# Patient Record
Sex: MALE | Race: WHITE | NOT HISPANIC OR LATINO
[De-identification: names, ages, dates, MRNs, and addresses within clinical notes are randomized per-mention and may not be internally consistent; named-entity substitution may affect disease eponyms.]

---

## 2017-01-30 ENCOUNTER — APPOINTMENT (OUTPATIENT)
Dept: FAMILY MEDICINE | Facility: CLINIC | Age: 77
End: 2017-01-30

## 2017-01-30 VITALS — DIASTOLIC BLOOD PRESSURE: 90 MMHG | SYSTOLIC BLOOD PRESSURE: 150 MMHG

## 2017-01-30 LAB
INR PPP: 1.3 RATIO
POCT-PROTHROMBIN TIME: 15.4 SECS

## 2017-01-31 LAB
INR PPP: 1.32 RATIO
PT BLD: 15 SEC

## 2017-02-07 ENCOUNTER — RX RENEWAL (OUTPATIENT)
Age: 77
End: 2017-02-07

## 2017-03-06 ENCOUNTER — RX RENEWAL (OUTPATIENT)
Age: 77
End: 2017-03-06

## 2017-03-30 ENCOUNTER — APPOINTMENT (OUTPATIENT)
Dept: FAMILY MEDICINE | Facility: CLINIC | Age: 77
End: 2017-03-30

## 2017-03-30 VITALS — SYSTOLIC BLOOD PRESSURE: 130 MMHG | DIASTOLIC BLOOD PRESSURE: 86 MMHG

## 2017-03-30 DIAGNOSIS — Z12.5 ENCOUNTER FOR SCREENING FOR MALIGNANT NEOPLASM OF PROSTATE: ICD-10-CM

## 2017-03-30 DIAGNOSIS — R53.81 OTHER MALAISE: ICD-10-CM

## 2017-03-31 LAB
ALBUMIN SERPL ELPH-MCNC: 4.5 G/DL
ALP BLD-CCNC: 71 U/L
ALT SERPL-CCNC: 14 U/L
ANION GAP SERPL CALC-SCNC: 17 MMOL/L
AST SERPL-CCNC: 21 U/L
BASOPHILS # BLD AUTO: 0.05 K/UL
BASOPHILS NFR BLD AUTO: 0.6 %
BILIRUB SERPL-MCNC: 0.8 MG/DL
BUN SERPL-MCNC: 31 MG/DL
CALCIUM SERPL-MCNC: 10.1 MG/DL
CHLORIDE SERPL-SCNC: 104 MMOL/L
CHOLEST SERPL-MCNC: 249 MG/DL
CHOLEST/HDLC SERPL: 4.8 RATIO
CO2 SERPL-SCNC: 21 MMOL/L
CREAT SERPL-MCNC: 1.34 MG/DL
EOSINOPHIL # BLD AUTO: 0.35 K/UL
EOSINOPHIL NFR BLD AUTO: 4 %
GLUCOSE SERPL-MCNC: 105 MG/DL
HCT VFR BLD CALC: 45.4 %
HDLC SERPL-MCNC: 52 MG/DL
HGB BLD-MCNC: 14.8 G/DL
IMM GRANULOCYTES NFR BLD AUTO: 0.2 %
INR PPP: 1.78 RATIO
LDLC SERPL CALC-MCNC: 174 MG/DL
LYMPHOCYTES # BLD AUTO: 1.74 K/UL
LYMPHOCYTES NFR BLD AUTO: 19.9 %
MAN DIFF?: NORMAL
MCHC RBC-ENTMCNC: 29.7 PG
MCHC RBC-ENTMCNC: 32.6 GM/DL
MCV RBC AUTO: 91.2 FL
MONOCYTES # BLD AUTO: 1 K/UL
MONOCYTES NFR BLD AUTO: 11.4 %
NEUTROPHILS # BLD AUTO: 5.6 K/UL
NEUTROPHILS NFR BLD AUTO: 63.9 %
PLATELET # BLD AUTO: 267 K/UL
POTASSIUM SERPL-SCNC: 4.6 MMOL/L
PROT SERPL-MCNC: 7.7 G/DL
PSA SERPL-MCNC: 68.63 NG/ML
PT BLD: 20.4 SEC
RBC # BLD: 4.98 M/UL
RBC # FLD: 14.4 %
SODIUM SERPL-SCNC: 142 MMOL/L
TRIGL SERPL-MCNC: 116 MG/DL
TSH SERPL-ACNC: 0.38 UIU/ML
WBC # FLD AUTO: 8.76 K/UL

## 2017-05-30 ENCOUNTER — APPOINTMENT (OUTPATIENT)
Dept: FAMILY MEDICINE | Facility: CLINIC | Age: 77
End: 2017-05-30

## 2017-05-30 LAB
INR PPP: 1.9 RATIO
POCT-PROTHROMBIN TIME: 23.3 SECS

## 2017-07-31 ENCOUNTER — APPOINTMENT (OUTPATIENT)
Dept: FAMILY MEDICINE | Facility: CLINIC | Age: 77
End: 2017-07-31
Payer: MEDICARE

## 2017-07-31 LAB
INR PPP: 2.1 RATIO
POCT-PROTHROMBIN TIME: 24.6 SECS

## 2017-07-31 PROCEDURE — 85610 PROTHROMBIN TIME: CPT | Mod: QW

## 2017-07-31 PROCEDURE — 99213 OFFICE O/P EST LOW 20 MIN: CPT | Mod: 25

## 2017-09-05 ENCOUNTER — RX RENEWAL (OUTPATIENT)
Age: 77
End: 2017-09-05

## 2017-09-25 ENCOUNTER — APPOINTMENT (OUTPATIENT)
Dept: FAMILY MEDICINE | Facility: CLINIC | Age: 77
End: 2017-09-25
Payer: MEDICARE

## 2017-09-25 VITALS
HEIGHT: 72 IN | SYSTOLIC BLOOD PRESSURE: 130 MMHG | WEIGHT: 235 LBS | BODY MASS INDEX: 31.83 KG/M2 | DIASTOLIC BLOOD PRESSURE: 88 MMHG

## 2017-09-25 LAB
INR PPP: 3 RATIO
POCT-PROTHROMBIN TIME: 36.1 SECS

## 2017-09-25 PROCEDURE — 99213 OFFICE O/P EST LOW 20 MIN: CPT | Mod: 25

## 2017-09-25 PROCEDURE — 85610 PROTHROMBIN TIME: CPT | Mod: QW

## 2017-09-29 ENCOUNTER — RX RENEWAL (OUTPATIENT)
Age: 77
End: 2017-09-29

## 2017-12-04 ENCOUNTER — APPOINTMENT (OUTPATIENT)
Dept: FAMILY MEDICINE | Facility: CLINIC | Age: 77
End: 2017-12-04
Payer: MEDICARE

## 2017-12-04 VITALS
BODY MASS INDEX: 31.83 KG/M2 | HEIGHT: 72 IN | SYSTOLIC BLOOD PRESSURE: 110 MMHG | WEIGHT: 235 LBS | DIASTOLIC BLOOD PRESSURE: 70 MMHG

## 2017-12-04 LAB
INR PPP: 2.5 RATIO
POCT-PROTHROMBIN TIME: 30.4 SECS

## 2017-12-04 PROCEDURE — 85610 PROTHROMBIN TIME: CPT | Mod: QW

## 2017-12-04 PROCEDURE — 99214 OFFICE O/P EST MOD 30 MIN: CPT | Mod: 25

## 2018-02-05 ENCOUNTER — APPOINTMENT (OUTPATIENT)
Dept: FAMILY MEDICINE | Facility: CLINIC | Age: 78
End: 2018-02-05
Payer: MEDICARE

## 2018-02-05 VITALS — DIASTOLIC BLOOD PRESSURE: 84 MMHG | SYSTOLIC BLOOD PRESSURE: 120 MMHG

## 2018-02-05 PROCEDURE — 99214 OFFICE O/P EST MOD 30 MIN: CPT | Mod: 25

## 2018-02-05 PROCEDURE — 36415 COLL VENOUS BLD VENIPUNCTURE: CPT

## 2018-02-06 LAB
ALBUMIN SERPL ELPH-MCNC: 4.4 G/DL
ALP BLD-CCNC: 71 U/L
ALT SERPL-CCNC: 17 U/L
ANION GAP SERPL CALC-SCNC: 19 MMOL/L
AST SERPL-CCNC: 22 U/L
BASOPHILS # BLD AUTO: 0.06 K/UL
BASOPHILS NFR BLD AUTO: 0.7 %
BILIRUB SERPL-MCNC: 0.7 MG/DL
BUN SERPL-MCNC: 22 MG/DL
CALCIUM SERPL-MCNC: 9.7 MG/DL
CHLORIDE SERPL-SCNC: 105 MMOL/L
CHOLEST SERPL-MCNC: 229 MG/DL
CHOLEST/HDLC SERPL: 5.1 RATIO
CO2 SERPL-SCNC: 18 MMOL/L
CREAT SERPL-MCNC: 1.18 MG/DL
EOSINOPHIL # BLD AUTO: 0.1 K/UL
EOSINOPHIL NFR BLD AUTO: 1.2 %
GLUCOSE SERPL-MCNC: 112 MG/DL
HCT VFR BLD CALC: 47.4 %
HDLC SERPL-MCNC: 45 MG/DL
HGB BLD-MCNC: 15.2 G/DL
IMM GRANULOCYTES NFR BLD AUTO: 0.1 %
INR PPP: 1.54 RATIO
LDLC SERPL CALC-MCNC: 159 MG/DL
LYMPHOCYTES # BLD AUTO: 1.47 K/UL
LYMPHOCYTES NFR BLD AUTO: 18 %
MAN DIFF?: NORMAL
MCHC RBC-ENTMCNC: 29.6 PG
MCHC RBC-ENTMCNC: 32.1 GM/DL
MCV RBC AUTO: 92.2 FL
MONOCYTES # BLD AUTO: 0.55 K/UL
MONOCYTES NFR BLD AUTO: 6.7 %
NEUTROPHILS # BLD AUTO: 5.98 K/UL
NEUTROPHILS NFR BLD AUTO: 73.3 %
PLATELET # BLD AUTO: 280 K/UL
POTASSIUM SERPL-SCNC: 4.7 MMOL/L
PROT SERPL-MCNC: 7.9 G/DL
PT BLD: 17.6 SEC
RBC # BLD: 5.14 M/UL
RBC # FLD: 14.4 %
SODIUM SERPL-SCNC: 142 MMOL/L
TRIGL SERPL-MCNC: 123 MG/DL
TSH SERPL-ACNC: 0.51 UIU/ML
WBC # FLD AUTO: 8.17 K/UL

## 2018-03-22 ENCOUNTER — RX RENEWAL (OUTPATIENT)
Age: 78
End: 2018-03-22

## 2018-04-09 ENCOUNTER — APPOINTMENT (OUTPATIENT)
Dept: FAMILY MEDICINE | Facility: CLINIC | Age: 78
End: 2018-04-09
Payer: MEDICARE

## 2018-04-09 VITALS — WEIGHT: 235 LBS | BODY MASS INDEX: 31.83 KG/M2 | HEIGHT: 72 IN

## 2018-04-09 VITALS — SYSTOLIC BLOOD PRESSURE: 126 MMHG | DIASTOLIC BLOOD PRESSURE: 96 MMHG

## 2018-04-09 LAB
INR PPP: 2.4 RATIO
POCT-PROTHROMBIN TIME: 29.1 SECS

## 2018-04-09 PROCEDURE — 99213 OFFICE O/P EST LOW 20 MIN: CPT

## 2018-06-25 ENCOUNTER — APPOINTMENT (OUTPATIENT)
Dept: FAMILY MEDICINE | Facility: CLINIC | Age: 78
End: 2018-06-25
Payer: MEDICARE

## 2018-06-25 VITALS
BODY MASS INDEX: 31.83 KG/M2 | SYSTOLIC BLOOD PRESSURE: 130 MMHG | DIASTOLIC BLOOD PRESSURE: 92 MMHG | WEIGHT: 235 LBS | HEIGHT: 72 IN

## 2018-06-25 LAB
INR PPP: 2.1 RATIO
POCT-PROTHROMBIN TIME: 24.9 SECS
QUALITY CONTROL: YES

## 2018-06-25 PROCEDURE — 99214 OFFICE O/P EST MOD 30 MIN: CPT | Mod: 25

## 2018-06-25 PROCEDURE — 85610 PROTHROMBIN TIME: CPT | Mod: QW

## 2018-08-27 ENCOUNTER — APPOINTMENT (OUTPATIENT)
Dept: FAMILY MEDICINE | Facility: CLINIC | Age: 78
End: 2018-08-27
Payer: MEDICARE

## 2018-08-27 VITALS
SYSTOLIC BLOOD PRESSURE: 140 MMHG | BODY MASS INDEX: 31.83 KG/M2 | HEIGHT: 72 IN | TEMPERATURE: 97.6 F | HEART RATE: 90 BPM | WEIGHT: 235 LBS | DIASTOLIC BLOOD PRESSURE: 90 MMHG | OXYGEN SATURATION: 98 %

## 2018-08-27 DIAGNOSIS — Z51.81 ENCOUNTER FOR THERAPEUTIC DRUG LVL MONITORING: ICD-10-CM

## 2018-08-27 DIAGNOSIS — Z79.01 ENCOUNTER FOR THERAPEUTIC DRUG LVL MONITORING: ICD-10-CM

## 2018-08-27 LAB
INR PPP: 1.6 RATIO
POCT-PROTHROMBIN TIME: 19.3 SECS
QUALITY CONTROL: YES

## 2018-08-27 PROCEDURE — 99213 OFFICE O/P EST LOW 20 MIN: CPT | Mod: 25

## 2018-08-27 PROCEDURE — 85610 PROTHROMBIN TIME: CPT | Mod: QW

## 2018-08-28 LAB
ALBUMIN SERPL ELPH-MCNC: 4.2 G/DL
ALP BLD-CCNC: 98 U/L
ALT SERPL-CCNC: 19 U/L
ANION GAP SERPL CALC-SCNC: 13 MMOL/L
AST SERPL-CCNC: 23 U/L
BASOPHILS # BLD AUTO: 0.05 K/UL
BASOPHILS NFR BLD AUTO: 0.6 %
BILIRUB SERPL-MCNC: 0.8 MG/DL
BUN SERPL-MCNC: 26 MG/DL
CALCIUM SERPL-MCNC: 9.8 MG/DL
CHLORIDE SERPL-SCNC: 105 MMOL/L
CHOLEST SERPL-MCNC: 209 MG/DL
CHOLEST/HDLC SERPL: 4.2 RATIO
CO2 SERPL-SCNC: 23 MMOL/L
CREAT SERPL-MCNC: 1.19 MG/DL
EOSINOPHIL # BLD AUTO: 0.17 K/UL
EOSINOPHIL NFR BLD AUTO: 2 %
GLUCOSE SERPL-MCNC: 100 MG/DL
HCT VFR BLD CALC: 44.2 %
HDLC SERPL-MCNC: 50 MG/DL
HGB BLD-MCNC: 14 G/DL
IMM GRANULOCYTES NFR BLD AUTO: 0.2 %
LDLC SERPL CALC-MCNC: 141 MG/DL
LYMPHOCYTES # BLD AUTO: 1.55 K/UL
LYMPHOCYTES NFR BLD AUTO: 18.1 %
MAN DIFF?: NORMAL
MCHC RBC-ENTMCNC: 30.4 PG
MCHC RBC-ENTMCNC: 31.7 GM/DL
MCV RBC AUTO: 95.9 FL
MONOCYTES # BLD AUTO: 0.7 K/UL
MONOCYTES NFR BLD AUTO: 8.2 %
NEUTROPHILS # BLD AUTO: 6.06 K/UL
NEUTROPHILS NFR BLD AUTO: 70.9 %
PLATELET # BLD AUTO: 304 K/UL
POTASSIUM SERPL-SCNC: 4.7 MMOL/L
PROT SERPL-MCNC: 7.6 G/DL
RBC # BLD: 4.61 M/UL
RBC # FLD: 14.9 %
SODIUM SERPL-SCNC: 141 MMOL/L
TRIGL SERPL-MCNC: 92 MG/DL
TSH SERPL-ACNC: 0.56 UIU/ML
WBC # FLD AUTO: 8.55 K/UL

## 2018-09-24 ENCOUNTER — MEDICATION RENEWAL (OUTPATIENT)
Age: 78
End: 2018-09-24

## 2018-10-29 ENCOUNTER — APPOINTMENT (OUTPATIENT)
Dept: FAMILY MEDICINE | Facility: CLINIC | Age: 78
End: 2018-10-29
Payer: MEDICARE

## 2018-10-29 VITALS
WEIGHT: 235 LBS | DIASTOLIC BLOOD PRESSURE: 100 MMHG | SYSTOLIC BLOOD PRESSURE: 140 MMHG | BODY MASS INDEX: 31.83 KG/M2 | HEIGHT: 72 IN

## 2018-10-29 LAB
INR PPP: 2.4 RATIO
POCT-PROTHROMBIN TIME: 28.2 SECS
QUALITY CONTROL: YES

## 2018-10-29 PROCEDURE — 93000 ELECTROCARDIOGRAM COMPLETE: CPT

## 2018-10-29 PROCEDURE — 85610 PROTHROMBIN TIME: CPT | Mod: QW

## 2018-10-29 PROCEDURE — G0438: CPT

## 2018-12-31 ENCOUNTER — APPOINTMENT (OUTPATIENT)
Dept: FAMILY MEDICINE | Facility: CLINIC | Age: 78
End: 2018-12-31
Payer: MEDICARE

## 2018-12-31 ENCOUNTER — RESULT CHARGE (OUTPATIENT)
Age: 78
End: 2018-12-31

## 2018-12-31 VITALS — DIASTOLIC BLOOD PRESSURE: 90 MMHG | SYSTOLIC BLOOD PRESSURE: 128 MMHG

## 2018-12-31 DIAGNOSIS — M19.90 UNSPECIFIED OSTEOARTHRITIS, UNSPECIFIED SITE: ICD-10-CM

## 2018-12-31 LAB
INR PPP: 4.1 RATIO
POCT-PROTHROMBIN TIME: 48.7 SECS
QUALITY CONTROL: YES

## 2018-12-31 PROCEDURE — 85610 PROTHROMBIN TIME: CPT | Mod: QW

## 2018-12-31 PROCEDURE — 99213 OFFICE O/P EST LOW 20 MIN: CPT | Mod: 25

## 2019-02-25 ENCOUNTER — APPOINTMENT (OUTPATIENT)
Dept: FAMILY MEDICINE | Facility: CLINIC | Age: 79
End: 2019-02-25
Payer: MEDICARE

## 2019-02-25 VITALS — SYSTOLIC BLOOD PRESSURE: 140 MMHG | DIASTOLIC BLOOD PRESSURE: 90 MMHG

## 2019-02-25 DIAGNOSIS — E78.00 PURE HYPERCHOLESTEROLEMIA, UNSPECIFIED: ICD-10-CM

## 2019-02-25 DIAGNOSIS — Z00.00 ENCOUNTER FOR GENERAL ADULT MEDICAL EXAMINATION W/OUT ABNORMAL FINDINGS: ICD-10-CM

## 2019-02-25 DIAGNOSIS — I10 ESSENTIAL (PRIMARY) HYPERTENSION: ICD-10-CM

## 2019-02-25 DIAGNOSIS — I48.91 UNSPECIFIED ATRIAL FIBRILLATION: ICD-10-CM

## 2019-02-25 PROCEDURE — 36415 COLL VENOUS BLD VENIPUNCTURE: CPT

## 2019-02-25 PROCEDURE — 99213 OFFICE O/P EST LOW 20 MIN: CPT

## 2019-02-25 PROCEDURE — 85610 PROTHROMBIN TIME: CPT | Mod: QW

## 2019-02-25 PROCEDURE — G0438: CPT

## 2019-02-26 LAB
ALBUMIN SERPL ELPH-MCNC: 3.9 G/DL
ALP BLD-CCNC: 2498 U/L
ALT SERPL-CCNC: 21 U/L
ANION GAP SERPL CALC-SCNC: 13 MMOL/L
AST SERPL-CCNC: 44 U/L
BASOPHILS # BLD AUTO: 0.08 K/UL
BASOPHILS NFR BLD AUTO: 0.8 %
BILIRUB SERPL-MCNC: 0.5 MG/DL
BUN SERPL-MCNC: 28 MG/DL
CALCIUM SERPL-MCNC: 9.4 MG/DL
CHLORIDE SERPL-SCNC: 104 MMOL/L
CHOLEST SERPL-MCNC: 172 MG/DL
CHOLEST/HDLC SERPL: 3.6 RATIO
CO2 SERPL-SCNC: 23 MMOL/L
CREAT SERPL-MCNC: 1.27 MG/DL
EOSINOPHIL # BLD AUTO: 0.03 K/UL
EOSINOPHIL NFR BLD AUTO: 0.3 %
GLUCOSE SERPL-MCNC: 118 MG/DL
HCT VFR BLD CALC: 42.3 %
HDLC SERPL-MCNC: 48 MG/DL
HGB BLD-MCNC: 12.5 G/DL
IMM GRANULOCYTES NFR BLD AUTO: 0.9 %
LDLC SERPL CALC-MCNC: 106 MG/DL
LYMPHOCYTES # BLD AUTO: 1.25 K/UL
LYMPHOCYTES NFR BLD AUTO: 12.3 %
MAN DIFF?: NORMAL
MCHC RBC-ENTMCNC: 26.9 PG
MCHC RBC-ENTMCNC: 29.6 GM/DL
MCV RBC AUTO: 91.2 FL
MONOCYTES # BLD AUTO: 0.99 K/UL
MONOCYTES NFR BLD AUTO: 9.8 %
NEUTROPHILS # BLD AUTO: 7.71 K/UL
NEUTROPHILS NFR BLD AUTO: 75.9 %
PLATELET # BLD AUTO: 417 K/UL
POTASSIUM SERPL-SCNC: 5.4 MMOL/L
PROT SERPL-MCNC: 7.7 G/DL
RBC # BLD: 4.64 M/UL
RBC # FLD: 15.2 %
SODIUM SERPL-SCNC: 140 MMOL/L
TRIGL SERPL-MCNC: 90 MG/DL
TSH SERPL-ACNC: 0.14 UIU/ML
WBC # FLD AUTO: 10.15 K/UL

## 2019-03-01 ENCOUNTER — APPOINTMENT (OUTPATIENT)
Dept: UROLOGY | Facility: CLINIC | Age: 79
End: 2019-03-01
Payer: MEDICARE

## 2019-03-01 VITALS
BODY MASS INDEX: 25.77 KG/M2 | WEIGHT: 190.25 LBS | HEART RATE: 124 BPM | HEIGHT: 72 IN | DIASTOLIC BLOOD PRESSURE: 70 MMHG | OXYGEN SATURATION: 97 % | SYSTOLIC BLOOD PRESSURE: 101 MMHG

## 2019-03-01 DIAGNOSIS — R97.20 ELEVATED PROSTATE, SPECIFIC ANTIGEN [PSA]: ICD-10-CM

## 2019-03-01 PROCEDURE — 99204 OFFICE O/P NEW MOD 45 MIN: CPT

## 2019-03-01 NOTE — REVIEW OF SYSTEMS
[Negative] : Heme/Lymph [Feeling Tired] : feeling tired [Recent Weight Loss (___ Lbs)] : recent [unfilled] ~Ulb weight loss [Wake up at night to urinate  How many times?  ___] : wakes up to urinate [unfilled] times during the night [Joint Pain] : joint pain [Joint Swelling] : joint swelling [Difficulty Walking] : difficulty walking

## 2019-03-08 LAB — PSA SERPL-MCNC: 770 NG/ML

## 2019-03-10 NOTE — HISTORY OF PRESENT ILLNESS
[FreeTextEntry1] : Jesús Luong presents to the office today. He is a 78-year-old man who had been previously noted to have PSA elevation. He had been recommended by his primary care physician to see a urologist but he never did so. He has now lost about 40 pounds by his own report. He is having some pain in his were occasional rotational movements. He has been found to have more significant PSA elevation and alkaline phosphatase level of 2500. He denies ever having had seen a urologist in the past he also denies having a prostate biopsy. He does not know of any known family history or personal history of prostate cancer diagnosis but does understand that his most recent laboratory evaluation strongly suggests the presence of prostate cancer based on his discussion with his primary care physician. He is now here today to have initial assessment from a urologic standpoint regarding these findings.\par \par The patient reports some baseline urinary symptoms including nocturia x3 and some sensation of incomplete bladder emptying. He also has frequency and occasionally feels the need to push to initiate his urinary stream.\par \par His medical history is notable for atrial fibrillation and is maintained on anticoagulation for this reason.

## 2019-03-10 NOTE — ASSESSMENT
[FreeTextEntry1] : I checked his PSA level today which is 770 ng/mL. His prostate is firm and feel suspicious for malignancy. These findings together plus his alkaline phosphatase level a markedly elevated all suggest very strong likelihood of metastatic prostate cancer involving his bones. His rib pain may also be related to metastatic prostate cancer.\par \par I would suggest initial evaluation with imaging to assess for the presence of prostate cancer metastasis. I have recommended CT scan of the chest, abdomen, and pelvis. I have also recommended bone scan imaging to assess for the presence of bony metastasis through the entire skeleton.\par \par Depending on those findings, further evaluation with prostate biopsy may be considered to establish a pathologic diagnosis.\par \par Most likely he is headed toward treatment with combined androgen deprivation therapy but I will likely enlist the help of a medical oncologist for further assessment as there may be other treatment indicated here given what appears to be a very advanced disease status.

## 2019-03-11 ENCOUNTER — OTHER (OUTPATIENT)
Age: 79
End: 2019-03-11

## 2019-03-13 ENCOUNTER — OTHER (OUTPATIENT)
Age: 79
End: 2019-03-13

## 2019-03-18 ENCOUNTER — APPOINTMENT (OUTPATIENT)
Dept: FAMILY MEDICINE | Facility: CLINIC | Age: 79
End: 2019-03-18

## 2019-03-19 ENCOUNTER — FORM ENCOUNTER (OUTPATIENT)
Age: 79
End: 2019-03-19

## 2019-03-20 ENCOUNTER — APPOINTMENT (OUTPATIENT)
Dept: CT IMAGING | Facility: IMAGING CENTER | Age: 79
End: 2019-03-20
Payer: MEDICARE

## 2019-03-20 ENCOUNTER — APPOINTMENT (OUTPATIENT)
Dept: NUCLEAR MEDICINE | Facility: IMAGING CENTER | Age: 79
End: 2019-03-20
Payer: MEDICARE

## 2019-03-20 ENCOUNTER — OUTPATIENT (OUTPATIENT)
Dept: OUTPATIENT SERVICES | Facility: HOSPITAL | Age: 79
LOS: 1 days | End: 2019-03-20
Payer: COMMERCIAL

## 2019-03-20 DIAGNOSIS — C61 MALIGNANT NEOPLASM OF PROSTATE: ICD-10-CM

## 2019-03-20 PROCEDURE — A9561: CPT

## 2019-03-20 PROCEDURE — 78320: CPT | Mod: 26

## 2019-03-20 PROCEDURE — 71260 CT THORAX DX C+: CPT

## 2019-03-20 PROCEDURE — 78306 BONE IMAGING WHOLE BODY: CPT | Mod: 26

## 2019-03-20 PROCEDURE — 78999 UNLISTED MISC PX DX NUC MED: CPT

## 2019-03-20 PROCEDURE — 74177 CT ABD & PELVIS W/CONTRAST: CPT

## 2019-03-20 PROCEDURE — 74177 CT ABD & PELVIS W/CONTRAST: CPT | Mod: 26

## 2019-03-20 PROCEDURE — 71260 CT THORAX DX C+: CPT | Mod: 26

## 2019-03-20 PROCEDURE — 78306 BONE IMAGING WHOLE BODY: CPT

## 2019-03-20 RX ORDER — BICALUTAMIDE 50 MG/1
50 TABLET ORAL
Qty: 90 | Refills: 3 | Status: ACTIVE | COMMUNITY
Start: 2019-03-20 | End: 1900-01-01

## 2019-04-02 ENCOUNTER — APPOINTMENT (OUTPATIENT)
Dept: UROLOGY | Facility: CLINIC | Age: 79
End: 2019-04-02
Payer: MEDICARE

## 2019-04-02 ENCOUNTER — OUTPATIENT (OUTPATIENT)
Dept: OUTPATIENT SERVICES | Facility: HOSPITAL | Age: 79
LOS: 1 days | End: 2019-04-02
Payer: COMMERCIAL

## 2019-04-02 VITALS
SYSTOLIC BLOOD PRESSURE: 127 MMHG | DIASTOLIC BLOOD PRESSURE: 82 MMHG | RESPIRATION RATE: 16 BRPM | TEMPERATURE: 98.1 F | HEART RATE: 137 BPM

## 2019-04-02 DIAGNOSIS — C61 MALIGNANT NEOPLASM OF PROSTATE: ICD-10-CM

## 2019-04-02 DIAGNOSIS — R35.0 FREQUENCY OF MICTURITION: ICD-10-CM

## 2019-04-02 PROCEDURE — 76872 US TRANSRECTAL: CPT

## 2019-04-02 PROCEDURE — 55700: CPT | Mod: 22

## 2019-04-02 PROCEDURE — 96402 CHEMO HORMON ANTINEOPL SQ/IM: CPT | Mod: 59

## 2019-04-02 PROCEDURE — 76872 US TRANSRECTAL: CPT | Mod: 26

## 2019-04-02 PROCEDURE — 76942 ECHO GUIDE FOR BIOPSY: CPT | Mod: 26,59

## 2019-04-02 PROCEDURE — 55700: CPT

## 2019-04-02 PROCEDURE — 76942 ECHO GUIDE FOR BIOPSY: CPT | Mod: 59

## 2019-04-02 RX ORDER — LEUPROLIDE ACETATE 45 MG
45 KIT INTRAMUSCULAR
Qty: 1 | Refills: 0 | Status: COMPLETED | OUTPATIENT
Start: 2019-04-02 | End: 2019-04-02

## 2019-04-02 RX ORDER — LEUPROLIDE ACETATE 45 MG
45 KIT INTRAMUSCULAR
Qty: 1 | Refills: 0 | Status: COMPLETED | OUTPATIENT
Start: 2019-04-02

## 2019-04-02 RX ADMIN — LEUPROLIDE ACETATE 0 MG: KIT at 00:00

## 2019-04-04 DIAGNOSIS — C61 MALIGNANT NEOPLASM OF PROSTATE: ICD-10-CM

## 2019-04-05 LAB — CORE LAB BIOPSY: NORMAL

## 2019-04-09 ENCOUNTER — OUTPATIENT (OUTPATIENT)
Dept: OUTPATIENT SERVICES | Facility: HOSPITAL | Age: 79
LOS: 1 days | Discharge: HOME | End: 2019-04-09

## 2019-04-09 ENCOUNTER — APPOINTMENT (OUTPATIENT)
Dept: HEMATOLOGY ONCOLOGY | Facility: CLINIC | Age: 79
End: 2019-04-09
Payer: MEDICARE

## 2019-04-09 ENCOUNTER — RESULT REVIEW (OUTPATIENT)
Age: 79
End: 2019-04-09

## 2019-04-09 VITALS
HEART RATE: 108 BPM | BODY MASS INDEX: 25.77 KG/M2 | RESPIRATION RATE: 16 BRPM | DIASTOLIC BLOOD PRESSURE: 72 MMHG | OXYGEN SATURATION: 97 % | SYSTOLIC BLOOD PRESSURE: 115 MMHG | TEMPERATURE: 97.5 F | WEIGHT: 190 LBS

## 2019-04-09 DIAGNOSIS — Z85.46 PERSONAL HISTORY OF MALIGNANT NEOPLASM OF PROSTATE: ICD-10-CM

## 2019-04-09 DIAGNOSIS — Z86.79 PERSONAL HISTORY OF OTHER DISEASES OF THE CIRCULATORY SYSTEM: ICD-10-CM

## 2019-04-09 DIAGNOSIS — Z87.11 PERSONAL HISTORY OF PEPTIC ULCER DISEASE: ICD-10-CM

## 2019-04-09 LAB
BASOPHILS # BLD AUTO: 0 K/UL — SIGNIFICANT CHANGE UP (ref 0–0.2)
BASOPHILS NFR BLD AUTO: 0.3 % — SIGNIFICANT CHANGE UP (ref 0–2)
EOSINOPHIL # BLD AUTO: 0.2 K/UL — SIGNIFICANT CHANGE UP (ref 0–0.5)
EOSINOPHIL NFR BLD AUTO: 1.8 % — SIGNIFICANT CHANGE UP (ref 0–6)
HCT VFR BLD CALC: 37.9 % — LOW (ref 39–50)
HGB BLD-MCNC: 12.8 G/DL — LOW (ref 13–17)
LYMPHOCYTES # BLD AUTO: 1 K/UL — SIGNIFICANT CHANGE UP (ref 1–3.3)
LYMPHOCYTES # BLD AUTO: 9.7 % — LOW (ref 13–44)
MCHC RBC-ENTMCNC: 29 PG — SIGNIFICANT CHANGE UP (ref 27–34)
MCHC RBC-ENTMCNC: 33.7 G/DL — SIGNIFICANT CHANGE UP (ref 32–36)
MCV RBC AUTO: 86 FL — SIGNIFICANT CHANGE UP (ref 80–100)
MONOCYTES # BLD AUTO: 1.3 K/UL — HIGH (ref 0–0.9)
MONOCYTES NFR BLD AUTO: 11.9 % — SIGNIFICANT CHANGE UP (ref 2–14)
NEUTROPHILS # BLD AUTO: 8.1 K/UL — HIGH (ref 1.8–7.4)
NEUTROPHILS NFR BLD AUTO: 76.2 % — SIGNIFICANT CHANGE UP (ref 43–77)
PLATELET # BLD AUTO: 287 K/UL — SIGNIFICANT CHANGE UP (ref 150–400)
RBC # BLD: 4.41 M/UL — SIGNIFICANT CHANGE UP (ref 4.2–5.8)
RBC # FLD: 17.4 % — HIGH (ref 10.3–14.5)
WBC # BLD: 10.6 K/UL — HIGH (ref 3.8–10.5)
WBC # FLD AUTO: 10.6 K/UL — HIGH (ref 3.8–10.5)

## 2019-04-09 PROCEDURE — 99205 OFFICE O/P NEW HI 60 MIN: CPT

## 2019-04-09 RX ORDER — PREDNISONE 5 MG/1
5 TABLET ORAL TWICE DAILY
Qty: 60 | Refills: 5 | Status: ACTIVE | COMMUNITY
Start: 2019-04-09 | End: 1900-01-01

## 2019-04-09 RX ORDER — OXYCODONE AND ACETAMINOPHEN 5; 325 MG/1; MG/1
5-325 TABLET ORAL
Qty: 120 | Refills: 0 | Status: ACTIVE | COMMUNITY
Start: 2019-04-09 | End: 1900-01-01

## 2019-04-09 RX ORDER — CALCIUM CARBONATE/VITAMIN D3 600 MG-10
600-400 TABLET ORAL
Qty: 90 | Refills: 5 | Status: ACTIVE | COMMUNITY
Start: 2019-04-09 | End: 1900-01-01

## 2019-04-09 RX ORDER — ABIRATERONE ACETATE 500 MG/1
500 TABLET, FILM COATED ORAL
Qty: 60 | Refills: 5 | Status: ACTIVE | COMMUNITY
Start: 2019-04-09 | End: 1900-01-01

## 2019-04-10 DIAGNOSIS — Z63.4 DISAPPEARANCE AND DEATH OF FAMILY MEMBER: ICD-10-CM

## 2019-04-10 DIAGNOSIS — Z60.2 PROBLEMS RELATED TO LIVING ALONE: ICD-10-CM

## 2019-04-10 SDOH — SOCIAL STABILITY - SOCIAL INSECURITY: DISSAPEARANCE AND DEATH OF FAMILY MEMBER: Z63.4

## 2019-04-10 SDOH — SOCIAL STABILITY - SOCIAL INSECURITY: PROBLEMS RELATED TO LIVING ALONE: Z60.2

## 2019-04-12 NOTE — PHYSICAL EXAM
[Capable of only limited self care, confined to bed or chair more than 50% of waking hours] : Status 3- Capable of only limited self care, confined to bed or chair more than 50% of waking hours [Normal] : affect appropriate [de-identified] : neck and bilateral hip tenderness with limited range of motion

## 2019-04-12 NOTE — ASSESSMENT
[Palliative] : Goals of care discussed with patient: Palliative [FreeTextEntry1] : Jesús Reed is a 78 years old male who presented with elevated PSA in 2011, on March 1, 2019 he was found to have , alkaline phosphatase level of 2500. CT chest/abdomen/pelvis on March 20 revealed retroperitoneal and pelvic lymphadenopathy, no visceral mets.  Whole body bone scan showed extensive bone metastases. TRUS biopsy showed prostate adenocarcinoma. He was started lupron and casodex on April 1. \par \par I had a lengthy discussion with the patient regarding his current cancer status and further management. He has extensive metastatic disease in the bones, kale 9. The goal of treatment is palliative. The standard of care is based on CHAARTED trial and Lat[employer] trials. Adding docetaxel or zytiga/prednisone to ADT  significantly prolonged overall survival. Given his age and poor performance status he is not a candidate for docetaxel. I start him zytiga/prednisone with potential AEs including but not limited to fatigue, nausea, vomiting, hypokalemia, hyponatremia, hypertension and liver damage. He needs to check his LFT every 2 weeks in the first 3 months. His many questions were answered in full to his satisfaction.

## 2019-04-12 NOTE — REASON FOR VISIT
[Initial Consultation] : an initial consultation [Spouse] : spouse [FreeTextEntry2] : metastatic prostate cancer

## 2019-04-12 NOTE — CONSULT LETTER
[Dear  ___] : Dear  [unfilled], [Please see my note below.] : Please see my note below. [Consult Letter:] : I had the pleasure of evaluating your patient, [unfilled]. [Sincerely,] : Sincerely, [Consult Closing:] : Thank you very much for allowing me to participate in the care of this patient.  If you have any questions, please do not hesitate to contact me. [DrErik  ___] : Dr. STEINER [FreeTextEntry3] : Ina Sullivan MD

## 2019-04-12 NOTE — HISTORY OF PRESENT ILLNESS
[T: ___] : T[unfilled] [N: ___] : N[unfilled] [M: ___] : M[unfilled] [AJCC Stage: ____] : AJCC Stage: [unfilled] [de-identified] : Jesús Luong a 78-year-old man who had been previously noted to have PSA elevation in 2011. He did not follow up with his PCP recommendation to see Urologist. He has had poor appetite and lost about 40 pounds in last three months. His neck pain and bilateral hip pain has been constant, 7/10. Moving over makes worse. He is not able to walk now.  He was seen by Dr. Aamir Ferreira on March 1, 2019. His , alkaline phosphatase level of 2500. CT chest/abdomen/pelvis on March 20 revealed retroperitoneal and pelvic lymphadenopathy, no visceral mets.  Whole body bone scan showed extensive bone metastases including humerus and femur, tibia, scapula, sternum, ribs. He underwent TRUS biopsy on April 2 with Dr. Ferreira. 4/12 cores showed prostate adenocarcinoma kale 5+4 with 10% to 95% involvement on each core, kale 4+5 2/12 cores positive, 6/12 core kale 4+4 with 20% to 90% involvement. Multiple cores also showed intraductal carcinoma. He did receive bicalutamide one week prior to lupron shot 45mg IM on April 7 which will be due in the beginning of Oct 2019. \par \par Nocturia 3 to 4. Urine flow is normal but slow, drips at the end. He denies frequency, burning and gross hematuria and incontinence.\par \par \par  [de-identified] : prostate adenocarcinoma and intraductal carcinoma

## 2019-04-12 NOTE — REVIEW OF SYSTEMS
[Fatigue] : fatigue [Recent Change In Weight] : ~T recent weight change [Shortness Of Breath] : shortness of breath [Cough] : cough [SOB on Exertion] : shortness of breath during exertion [Constipation] : constipation [Joint Pain] : joint pain [Muscle Pain] : muscle pain [Muscle Weakness] : muscle weakness [Insomnia] : insomnia [Depression] : depression [Anxiety] : anxiety [Negative] : Allergic/Immunologic [Abdominal Pain] : no abdominal pain [Wheezing] : no wheezing [Diarrhea] : no diarrhea [Vomiting] : no vomiting [Joint Stiffness] : no joint stiffness [FreeTextEntry9] : neck and bilateral hip

## 2019-04-14 DIAGNOSIS — C79.51 SECONDARY MALIGNANT NEOPLASM OF BONE: ICD-10-CM

## 2019-04-14 DIAGNOSIS — C61 MALIGNANT NEOPLASM OF PROSTATE: ICD-10-CM

## 2019-04-17 ENCOUNTER — MEDICATION RENEWAL (OUTPATIENT)
Age: 79
End: 2019-04-17

## 2019-04-17 RX ORDER — WHEELCHAIR
EACH MISCELLANEOUS
Qty: 1 | Refills: 0 | Status: ACTIVE | OUTPATIENT
Start: 2019-04-17

## 2019-04-18 ENCOUNTER — INPATIENT (INPATIENT)
Facility: HOSPITAL | Age: 79
LOS: 18 days | Discharge: HOSPICE | End: 2019-05-07
Attending: INTERNAL MEDICINE | Admitting: INTERNAL MEDICINE
Payer: MEDICARE

## 2019-04-18 VITALS
SYSTOLIC BLOOD PRESSURE: 119 MMHG | DIASTOLIC BLOOD PRESSURE: 80 MMHG | RESPIRATION RATE: 19 BRPM | WEIGHT: 190.04 LBS | TEMPERATURE: 98 F | OXYGEN SATURATION: 95 % | HEART RATE: 100 BPM

## 2019-04-18 DIAGNOSIS — I48.2 CHRONIC ATRIAL FIBRILLATION: ICD-10-CM

## 2019-04-18 DIAGNOSIS — M54.5 LOW BACK PAIN: ICD-10-CM

## 2019-04-18 DIAGNOSIS — I10 ESSENTIAL (PRIMARY) HYPERTENSION: ICD-10-CM

## 2019-04-18 DIAGNOSIS — Z29.9 ENCOUNTER FOR PROPHYLACTIC MEASURES, UNSPECIFIED: ICD-10-CM

## 2019-04-18 DIAGNOSIS — C61 MALIGNANT NEOPLASM OF PROSTATE: ICD-10-CM

## 2019-04-18 DIAGNOSIS — R53.1 WEAKNESS: ICD-10-CM

## 2019-04-18 LAB
ALBUMIN SERPL ELPH-MCNC: 3.2 G/DL — LOW (ref 3.3–5)
ALP SERPL-CCNC: 501 U/L — HIGH (ref 40–120)
ALT FLD-CCNC: 19 U/L — SIGNIFICANT CHANGE UP (ref 12–78)
ANION GAP SERPL CALC-SCNC: 10 MMOL/L — SIGNIFICANT CHANGE UP (ref 5–17)
ANISOCYTOSIS BLD QL: SLIGHT — SIGNIFICANT CHANGE UP
APTT BLD: 25.1 SEC — LOW (ref 28.5–37)
AST SERPL-CCNC: 25 U/L — SIGNIFICANT CHANGE UP (ref 15–37)
BASOPHILS # BLD AUTO: 0 K/UL — SIGNIFICANT CHANGE UP (ref 0–0.2)
BASOPHILS NFR BLD AUTO: 0 % — SIGNIFICANT CHANGE UP (ref 0–2)
BILIRUB SERPL-MCNC: 1.4 MG/DL — HIGH (ref 0.2–1.2)
BUN SERPL-MCNC: 73 MG/DL — HIGH (ref 7–23)
BURR CELLS BLD QL SMEAR: SLIGHT — SIGNIFICANT CHANGE UP
CALCIUM SERPL-MCNC: 9.3 MG/DL — SIGNIFICANT CHANGE UP (ref 8.5–10.1)
CHLORIDE SERPL-SCNC: 105 MMOL/L — SIGNIFICANT CHANGE UP (ref 96–108)
CO2 SERPL-SCNC: 20 MMOL/L — LOW (ref 22–31)
CREAT SERPL-MCNC: 1.93 MG/DL — HIGH (ref 0.5–1.3)
ELLIPTOCYTES BLD QL SMEAR: SLIGHT — SIGNIFICANT CHANGE UP
EOSINOPHIL # BLD AUTO: 0 K/UL — SIGNIFICANT CHANGE UP (ref 0–0.5)
EOSINOPHIL NFR BLD AUTO: 0 % — SIGNIFICANT CHANGE UP (ref 0–6)
GLUCOSE SERPL-MCNC: 118 MG/DL — HIGH (ref 70–99)
HCT VFR BLD CALC: 40.3 % — SIGNIFICANT CHANGE UP (ref 39–50)
HGB BLD-MCNC: 13 G/DL — SIGNIFICANT CHANGE UP (ref 13–17)
HYPOCHROMIA BLD QL: SLIGHT — SIGNIFICANT CHANGE UP
INR BLD: 1.28 RATIO — HIGH (ref 0.88–1.16)
LACTATE SERPL-SCNC: 1.7 MMOL/L — SIGNIFICANT CHANGE UP (ref 0.7–2)
LYMPHOCYTES # BLD AUTO: 1.41 K/UL — SIGNIFICANT CHANGE UP (ref 1–3.3)
LYMPHOCYTES # BLD AUTO: 12 % — LOW (ref 13–44)
MACROCYTES BLD QL: SLIGHT — SIGNIFICANT CHANGE UP
MANUAL SMEAR VERIFICATION: SIGNIFICANT CHANGE UP
MCHC RBC-ENTMCNC: 28.7 PG — SIGNIFICANT CHANGE UP (ref 27–34)
MCHC RBC-ENTMCNC: 32.3 GM/DL — SIGNIFICANT CHANGE UP (ref 32–36)
MCV RBC AUTO: 89 FL — SIGNIFICANT CHANGE UP (ref 80–100)
MICROCYTES BLD QL: SIGNIFICANT CHANGE UP
MONOCYTES # BLD AUTO: 0.47 K/UL — SIGNIFICANT CHANGE UP (ref 0–0.9)
MONOCYTES NFR BLD AUTO: 4 % — SIGNIFICANT CHANGE UP (ref 2–14)
NEUTROPHILS # BLD AUTO: 9.86 K/UL — HIGH (ref 1.8–7.4)
NEUTROPHILS NFR BLD AUTO: 84 % — HIGH (ref 43–77)
NRBC # BLD: 1 /100 — HIGH (ref 0–0)
NRBC # BLD: SIGNIFICANT CHANGE UP /100 WBCS (ref 0–0)
OVALOCYTES BLD QL SMEAR: SLIGHT — SIGNIFICANT CHANGE UP
PLAT MORPH BLD: NORMAL — SIGNIFICANT CHANGE UP
PLATELET # BLD AUTO: 253 K/UL — SIGNIFICANT CHANGE UP (ref 150–400)
POIKILOCYTOSIS BLD QL AUTO: SLIGHT — SIGNIFICANT CHANGE UP
POLYCHROMASIA BLD QL SMEAR: SIGNIFICANT CHANGE UP
POTASSIUM SERPL-MCNC: 4.4 MMOL/L — SIGNIFICANT CHANGE UP (ref 3.5–5.3)
POTASSIUM SERPL-SCNC: 4.4 MMOL/L — SIGNIFICANT CHANGE UP (ref 3.5–5.3)
PROT SERPL-MCNC: 7 GM/DL — SIGNIFICANT CHANGE UP (ref 6–8.3)
PROTHROM AB SERPL-ACNC: 14.4 SEC — HIGH (ref 10–12.9)
RBC # BLD: 4.53 M/UL — SIGNIFICANT CHANGE UP (ref 4.2–5.8)
RBC # FLD: 22.5 % — HIGH (ref 10.3–14.5)
RBC BLD AUTO: ABNORMAL
SODIUM SERPL-SCNC: 135 MMOL/L — SIGNIFICANT CHANGE UP (ref 135–145)
WBC # BLD: 11.74 K/UL — HIGH (ref 3.8–10.5)
WBC # FLD AUTO: 11.74 K/UL — HIGH (ref 3.8–10.5)

## 2019-04-18 PROCEDURE — 99223 1ST HOSP IP/OBS HIGH 75: CPT

## 2019-04-18 PROCEDURE — 72128 CT CHEST SPINE W/O DYE: CPT | Mod: 26

## 2019-04-18 PROCEDURE — 99285 EMERGENCY DEPT VISIT HI MDM: CPT

## 2019-04-18 PROCEDURE — 72131 CT LUMBAR SPINE W/O DYE: CPT | Mod: 26

## 2019-04-18 PROCEDURE — 71045 X-RAY EXAM CHEST 1 VIEW: CPT | Mod: 26

## 2019-04-18 PROCEDURE — 93010 ELECTROCARDIOGRAM REPORT: CPT

## 2019-04-18 PROCEDURE — 72125 CT NECK SPINE W/O DYE: CPT | Mod: 26

## 2019-04-18 PROCEDURE — 76377 3D RENDER W/INTRP POSTPROCES: CPT | Mod: 26

## 2019-04-18 RX ORDER — ONDANSETRON 8 MG/1
8 TABLET, FILM COATED ORAL ONCE
Qty: 0 | Refills: 0 | Status: COMPLETED | OUTPATIENT
Start: 2019-04-18 | End: 2019-04-18

## 2019-04-18 RX ORDER — OXYCODONE HYDROCHLORIDE 5 MG/1
10 TABLET ORAL EVERY 12 HOURS
Qty: 0 | Refills: 0 | Status: DISCONTINUED | OUTPATIENT
Start: 2019-04-18 | End: 2019-04-25

## 2019-04-18 RX ORDER — SODIUM CHLORIDE 9 MG/ML
1000 INJECTION INTRAMUSCULAR; INTRAVENOUS; SUBCUTANEOUS
Qty: 0 | Refills: 0 | Status: DISCONTINUED | OUTPATIENT
Start: 2019-04-18 | End: 2019-04-20

## 2019-04-18 RX ORDER — METOPROLOL TARTRATE 50 MG
50 TABLET ORAL
Qty: 0 | Refills: 0 | Status: DISCONTINUED | OUTPATIENT
Start: 2019-04-18 | End: 2019-05-07

## 2019-04-18 RX ORDER — MORPHINE SULFATE 50 MG/1
2 CAPSULE, EXTENDED RELEASE ORAL EVERY 4 HOURS
Qty: 0 | Refills: 0 | Status: DISCONTINUED | OUTPATIENT
Start: 2019-04-18 | End: 2019-04-18

## 2019-04-18 RX ORDER — MORPHINE SULFATE 50 MG/1
4 CAPSULE, EXTENDED RELEASE ORAL ONCE
Qty: 0 | Refills: 0 | Status: DISCONTINUED | OUTPATIENT
Start: 2019-04-18 | End: 2019-04-18

## 2019-04-18 RX ORDER — ONDANSETRON 8 MG/1
4 TABLET, FILM COATED ORAL EVERY 6 HOURS
Qty: 0 | Refills: 0 | Status: DISCONTINUED | OUTPATIENT
Start: 2019-04-18 | End: 2019-05-07

## 2019-04-18 RX ORDER — HEPARIN SODIUM 5000 [USP'U]/ML
5000 INJECTION INTRAVENOUS; SUBCUTANEOUS EVERY 12 HOURS
Qty: 0 | Refills: 0 | Status: DISCONTINUED | OUTPATIENT
Start: 2019-04-18 | End: 2019-05-07

## 2019-04-18 RX ORDER — BICALUTAMIDE 50 MG/1
50 TABLET, FILM COATED ORAL DAILY
Qty: 0 | Refills: 0 | Status: DISCONTINUED | OUTPATIENT
Start: 2019-04-18 | End: 2019-05-06

## 2019-04-18 RX ADMIN — SODIUM CHLORIDE 100 MILLILITER(S): 9 INJECTION INTRAMUSCULAR; INTRAVENOUS; SUBCUTANEOUS at 18:00

## 2019-04-18 RX ADMIN — MORPHINE SULFATE 4 MILLIGRAM(S): 50 CAPSULE, EXTENDED RELEASE ORAL at 14:25

## 2019-04-18 RX ADMIN — Medication 50 MILLIGRAM(S): at 19:39

## 2019-04-18 RX ADMIN — ONDANSETRON 8 MILLIGRAM(S): 8 TABLET, FILM COATED ORAL at 18:00

## 2019-04-18 RX ADMIN — OXYCODONE HYDROCHLORIDE 10 MILLIGRAM(S): 5 TABLET ORAL at 20:20

## 2019-04-18 RX ADMIN — OXYCODONE HYDROCHLORIDE 10 MILLIGRAM(S): 5 TABLET ORAL at 19:40

## 2019-04-18 NOTE — H&P ADULT - HISTORY OF PRESENT ILLNESS
· 78 year old male with PMH of Atrial Fibrillation (no longer on Coumadin), HTN, Prostate CA with mets to bone otherwise presenting due to increased back and neck pain and generalized weakness for past 2-3 weeks. Today  feeling extremely weak and having difficulty getting up or caring for himself. Patient lives alone. Patient reports one episode of vomiting this AM.  Denies chest pain, SOB,  fever/chills or headache. Patient cannot recall his medications. Calls to Dr Jimenez, office closed, needs to be followed up in AM for list of medications. · 78 year old male with PMH of Atrial Fibrillation (no longer on Coumadin), HTN, Prostate CA with mets to bone otherwise presenting due to increased back and neck pain and generalized weakness for past 2-3 weeks. Today  feeling extremely weak and having difficulty getting up or caring for himself. Patient lives alone. Patient reports one episode of vomiting this AM.  Denies chest pain, SOB,  fever/chills or headache. Patient cannot recall his medications or his Pharmacy. Calls to Dr Jimenez, office closed. Medication list was received from Dr TY ann of 4/12/19.

## 2019-04-18 NOTE — H&P ADULT - PROBLEM SELECTOR PLAN 1
Admit  Pain management  Palliative Care Consult  Follow up with Dr Jimenez  [phone] in am for medication list. Admit  Pain management  Palliative Care Consult, Dr Pena pending  Dr Aamir Ferreira Urologist  Dr Deo Forman Oncology  Follow up with Dr Jimenez  [phone] in am for medication list.

## 2019-04-18 NOTE — H&P ADULT - PROBLEM SELECTOR PLAN 4
Admit to telemetry  Start Metoprolol Q12H Admit to telemetry  Start Metoprolol Q12H  Patient reported he does not take Warfarin anymore because Dr Jimenez stopped it.

## 2019-04-18 NOTE — H&P ADULT - ASSESSMENT
78 year old male with PMH of prostate CA with mets to bone otherwise presenting due to increased back and neck pain and generalized weakness for past 2-3 weeks. Today  feeling extremely weak and having difficulty getting up or caring for himself. Patient lives alone. Patient reports one episode of vomiting this AM.  Denies chest pain, SOB,  fever/chills or headache. Patient admitted for pain control and weakness. Will require approximately 2 days of inpatient treatment and Rehab placement as he is unable to care for himself. Patient cannot recall his medications. Calls to Dr Jimenez, office closed, needs to be followed up in AM for list of medications.    IMPROVE VTE Individual Risk Assessment          RISK                                                          Points    [  ] Previous VTE                                                3    [  ] Thrombophilia                                             2    [  ] Lower limb paralysis                                    2        (unable to hold up >15 seconds)      [ x ] Current Cancer                                             2         (within 6 months)    [ x ] Immobilization > 24 hrs                              1    [  ] ICU/CCU stay > 24 hours                            1    [ x ] Age > 60                                                    1    IMPROVE VTE Score _________4    Low risk 0-1: [  ] Early ambulation                          [  ] Intermittent Compression Device    High Risk 2-12: [  ] Heparin 5,000 units SC Q8 H                              [x  ] Heparin 5,000 units SC Q 12 H                              [  ] LMWH 40 mg SC daily (CrCl > 30 ml) 78 year old male with PMH of prostate CA with mets to bone otherwise presenting due to increased back and neck pain and generalized weakness for past 2-3 weeks. Today  feeling extremely weak and having difficulty getting up or caring for himself. Patient lives alone. Patient reports one episode of vomiting this AM.  Denies chest pain, SOB,  fever/chills or headache. Patient admitted for pain control and weakness. Will require approximately 2 days of inpatient treatment and Rehab placement as he is unable to care for himself. Patient cannot recall his medications or Pharmacy.  Calls to Dr Jimenez, office closed.  Medication list was obtained from Dr Sullivan's note of 4/12/19.  IMPROVE VTE Individual Risk Assessment          RISK                                                          Points    [  ] Previous VTE                                                3    [  ] Thrombophilia                                             2    [  ] Lower limb paralysis                                    2        (unable to hold up >15 seconds)      [ x ] Current Cancer                                             2         (within 6 months)    [ x ] Immobilization > 24 hrs                              1    [  ] ICU/CCU stay > 24 hours                            1    [ x ] Age > 60                                                    1    IMPROVE VTE Score _________4    Low risk 0-1: [  ] Early ambulation                          [  ] Intermittent Compression Device    High Risk 2-12: [  ] Heparin 5,000 units SC Q8 H                              [x  ] Heparin 5,000 units SC Q 12 H                              [  ] LMWH 40 mg SC daily (CrCl > 30 ml)

## 2019-04-18 NOTE — H&P ADULT - NSHPSOCIALHISTORY_GEN_ALL_CORE
Patient lives alone, unable to care for himself anymore. Wife decreased. No children.  Denies use of alcohol, tobacco or recreational drugs.

## 2019-04-18 NOTE — H&P ADULT - PROBLEM SELECTOR PLAN 2
IVF hydration  Fall Precautions  PT Eval pending  Social work consult pending for rehab placement as patient unable to care for self. Lives alone.

## 2019-04-18 NOTE — ED PROVIDER NOTE - CLINICAL SUMMARY MEDICAL DECISION MAKING FREE TEXT BOX
pt with mets to bones with pain control needed, lives alone -will need admission for further care with Dr. Stanley.

## 2019-04-18 NOTE — ED ADULT TRIAGE NOTE - CHIEF COMPLAINT QUOTE
patient BIBA , c/o of neck pain R side pain , patient has a prostate Ca  metastasized in the neck , patient  c/o of mild difficulty swallowing , denied chest pain denied headache denied difficulty breathing , c/o of back pain

## 2019-04-18 NOTE — ED ADULT NURSE NOTE - OBJECTIVE STATEMENT
77 y/o male A&Ox4, PMH prostate ca w/mets, presents to ED with c/o increased weakness, inability to ambulate and increased pain in right side of neck and spine. Pt states he's been under treatment for prostate ca. Over past several days pt states pain has become unbearable and that he "could walk but now can't walk at all." Pt called EMS to be brought to ED for further evaluation and pain control. Upon further assessment, airway patent and intact, denies OB. ABD soft, denies n/v/d. Skin intact. Peripheral pulses strong and normal baseline sensation present x4. Safety and comfort measures maintained.

## 2019-04-18 NOTE — H&P ADULT - NSHPPHYSICALEXAM_GEN_ALL_CORE
Vital Signs Last 24 Hrs  T(C): 36.2 (18 Apr 2019 15:52), Max: 36.5 (18 Apr 2019 13:14)  T(F): 97.1 (18 Apr 2019 15:52), Max: 97.7 (18 Apr 2019 13:14)  HR: 100 (18 Apr 2019 15:52) (100 - 100)  BP: 138/84 (18 Apr 2019 15:52) (119/80 - 138/84)  BP(mean): --  RR: 18 (18 Apr 2019 15:52) (18 - 19)  SpO2: 97% (18 Apr 2019 15:52) (95% - 97%)        GENERAL: Appears weak  HEAD:  Atraumatic, Normocephalic  EYES: EOMI, PERRLA, conjunctiva and sclera clear  ENMT: No tonsillar erythema, exudates, or enlargement; Moist mucous membranes  NECK: Supple, No JVD, Normal thyroid  NERVOUS SYSTEM:  Alert & Oriented X3, Motor Strength 1/5 B/L upper and lower extremities  CHEST/LUNG: Clear to percussion bilaterally; No rales, rhonchi, wheezing, or rubs  HEART: Regular rate and rhythm; No murmurs, rubs, or gallops  ABDOMEN: Soft, Nontender, Nondistended; Bowel sounds present  EXTREMITIES:  2+ Peripheral Pulses, No clubbing, cyanosis, or edema  LYMPH: No lymphadenopathy noted  SKIN: No rashes or lesions

## 2019-04-18 NOTE — H&P ADULT - NSHPREVIEWOFSYSTEMS_GEN_ALL_CORE
CONSTITUTIONAL: Mailaise and generalized weakness.  EYES: No eye pain, visual disturbances, or discharge  ENMT: Denies  difficulty hearing, tinnitis, vertigo, sinus or   throat pain   NECK: Denies pain or stiffness  BREAST: Denies pain, masses, or nipple discharge    RESP: Denies SOB, dyspnea, cough, wheezing, chills or hemoptysis   CV: Denies  chest pain, SOB, JACOB, palpitations, dizziness, lightheadedness or leg swelling  GI: Denies abdominal  or epigastric pain, nausea, vomiting, hematemesis; diarrhea or changes in bowel pattern, no melena or hematochezia.  : Denies dysuria, urgency, frequency, retention, hematuria or incontinence  SKIN: Denies itching, burning, rashes or lesions  MSK: Difficulty with ambulation, Neck, back and bilateral leg pain  NEURO: Denies weakness, numbness, tingling, loss of sensation, vision, headaches, memory loss  LYMPH: Denies pain or enlarged glands  ENDO: Denies heat or cold intolerances, hair loss  PSYCH: Denies depression, anxiety of SI  HEME: Denies easy brusing or bleeding gums  ALLERGY/IMMUNOLOGY: Denies hives, eczema

## 2019-04-18 NOTE — ED PROVIDER NOTE - CARE PLAN
Principal Discharge DX:	Prostate cancer metastatic to bone  Secondary Diagnosis:	Back pain  Secondary Diagnosis:	Generalized weakness

## 2019-04-18 NOTE — H&P ADULT - NSICDXPASTMEDICALHX_GEN_ALL_CORE_FT
PAST MEDICAL HISTORY:  Atrial fibrillation     Bone metastases     HTN (hypertension)     Prostate CA

## 2019-04-18 NOTE — H&P ADULT - PROBLEM SELECTOR PLAN 6
Fall Precaution  VTE Precaution Heparin SQ Fall Precaution  VTE Precaution Heparin SQ  Please call Dr Jimenez's Office in AM for Medication Reconciliation. (Office is now closed)

## 2019-04-18 NOTE — H&P ADULT - NSHPLABSRESULTS_GEN_ALL_CORE
13.0   11.74 )-----------( 253      ( 18 Apr 2019 14:27 )             40.3   04-18    135  |  105  |  73<H>  ----------------------------<  118<H>  4.4   |  20<L>  |  1.93<H>    Ca    9.3      18 Apr 2019 14:27    TPro  7.0  /  Alb  3.2<L>  /  TBili  1.4<H>  /  DBili  x   /  AST  25  /  ALT  19  /  AlkPhos  501<H>  04-18  PT/INR - ( 18 Apr 2019 14:27 )   PT: 14.4 sec;   INR: 1.28 ratio         PTT - ( 18 Apr 2019 14:27 )  PTT:25.1 sec  < from: Xray Chest 1 View-PORTABLE IMMEDIATE (04.18.19 @ 17:18) >    INTERPRETATION:  History: Admission    AP semierect chest x-ray is performed. The study is limited by rotation.   There is no study for comparison.    The heart does not appear enlarged. There are bibasilar densities may   represent small pleural effusions however evaluation is limited   technically. There is osteopenia of the bony structures and degenerative   change.    Impression: Limited by rotation. Suspect small bilateral pleural   effusions which are seen on the CT scan of the spine of earlier in the   day. The known rib fractures are not apparent on this study    < end of copied text >    < from: CT Lumbar Spine No Cont (04.18.19 @ 16:09) >    FINDINGS:  There are numerous mixed osteolytic and osteoblastic lesions throughout   the cervical, thoracic and lumbar spine as well as sternum, ribs, sacrum   and hay partially included in the examination.    There are pathologic fracture in the C6 and C7 spinous processes (3-75,   87). There are pathologic fractures in the left posterior segments of   third, eighth and 11th ribs. There is no retropulsion of the posterior   vertebral margin.    There is no significant disc herniation, spinal canal there is moderate   disc narrowing of L2-3 and L5-S1. There is no significant spinal canal   stenosis.    IMPRESSION: Diffuse skeletal metastasis entire skeleton.    Pathologic fractures at the spinous processes of C6 and C7, left third,   eighth and 11th ribs.    No CT evidence of retropulsion or spinal canal involvement. However, if   cord compression is a concern, recommend MRI.    < end of copied text >    < from: CT Thoracic Spine No Cont (04.18.19 @ 16:08) >    FINDINGS:  There are numerous mixed osteolytic and osteoblastic lesions throughout   the cervical, thoracic and lumbar spine as well as sternum, ribs, sacrum   and hay partially included in the examination.    There are pathologic fracture in the C6 and C7 spinous processes (3-75,   87). There are pathologic fractures in the left posterior segments of   third, eighth and 11th ribs. There is no retropulsion of the posterior   vertebral margin.    There is no significant disc herniation, spinal canal there is moderate   disc narrowing of L2-3 and L5-S1. There is no significant spinal canal   stenosis.    IMPRESSION: Diffuse skeletal metastasis entire skeleton.    Pathologic fractures at the spinous processes of C6 and C7, left third,   eighth and 11th ribs.    No CT evidence of retropulsion or spinal canal involvement. However, if   cord compression is a concern, recommend MRI.    < end of copied text >

## 2019-04-18 NOTE — H&P ADULT - NSHPOUTPATIENTPROVIDERS_GEN_ALL_CORE
Dr Miguel Angel Jimenez [facility] [phone]  Dr Aamir Post Urologist  Dr Desouza ONcologist [facility] beds at the [facility] Dr Miguel Angel Jimenez [facility] [phone]  Dr Aamir Post Urologist  Dr Sullivan  _[facility]

## 2019-04-18 NOTE — ED PROVIDER NOTE - OBJECTIVE STATEMENT
78 year old male with PMH of prostate CA otherwise with mets to bone otherwise presenting due to increased back and neck pain and generalized weakness for past 2-3 weeks, today feeling difficulty getting up or doing anything for himself as he lives by himself. Denies any fever/chills no headache.

## 2019-04-19 DIAGNOSIS — N17.9 ACUTE KIDNEY FAILURE, UNSPECIFIED: ICD-10-CM

## 2019-04-19 DIAGNOSIS — R53.2 FUNCTIONAL QUADRIPLEGIA: ICD-10-CM

## 2019-04-19 DIAGNOSIS — E44.0 MODERATE PROTEIN-CALORIE MALNUTRITION: ICD-10-CM

## 2019-04-19 LAB
ANION GAP SERPL CALC-SCNC: 10 MMOL/L — SIGNIFICANT CHANGE UP (ref 5–17)
BUN SERPL-MCNC: 72 MG/DL — HIGH (ref 7–23)
CALCIUM SERPL-MCNC: 9.4 MG/DL — SIGNIFICANT CHANGE UP (ref 8.5–10.1)
CHLORIDE SERPL-SCNC: 108 MMOL/L — SIGNIFICANT CHANGE UP (ref 96–108)
CO2 SERPL-SCNC: 18 MMOL/L — LOW (ref 22–31)
CREAT SERPL-MCNC: 1.79 MG/DL — HIGH (ref 0.5–1.3)
GLUCOSE SERPL-MCNC: 101 MG/DL — HIGH (ref 70–99)
HCT VFR BLD CALC: 40.2 % — SIGNIFICANT CHANGE UP (ref 39–50)
HGB BLD-MCNC: 12.7 G/DL — LOW (ref 13–17)
MCHC RBC-ENTMCNC: 28.5 PG — SIGNIFICANT CHANGE UP (ref 27–34)
MCHC RBC-ENTMCNC: 31.6 GM/DL — LOW (ref 32–36)
MCV RBC AUTO: 90.1 FL — SIGNIFICANT CHANGE UP (ref 80–100)
NRBC # BLD: 0 /100 WBCS — SIGNIFICANT CHANGE UP (ref 0–0)
PLATELET # BLD AUTO: 240 K/UL — SIGNIFICANT CHANGE UP (ref 150–400)
POTASSIUM SERPL-MCNC: 4.2 MMOL/L — SIGNIFICANT CHANGE UP (ref 3.5–5.3)
POTASSIUM SERPL-SCNC: 4.2 MMOL/L — SIGNIFICANT CHANGE UP (ref 3.5–5.3)
RBC # BLD: 4.46 M/UL — SIGNIFICANT CHANGE UP (ref 4.2–5.8)
RBC # FLD: 22.9 % — HIGH (ref 10.3–14.5)
SODIUM SERPL-SCNC: 136 MMOL/L — SIGNIFICANT CHANGE UP (ref 135–145)
WBC # BLD: 13.83 K/UL — HIGH (ref 3.8–10.5)
WBC # FLD AUTO: 13.83 K/UL — HIGH (ref 3.8–10.5)

## 2019-04-19 PROCEDURE — 99233 SBSQ HOSP IP/OBS HIGH 50: CPT

## 2019-04-19 RX ADMIN — OXYCODONE HYDROCHLORIDE 10 MILLIGRAM(S): 5 TABLET ORAL at 18:10

## 2019-04-19 RX ADMIN — HEPARIN SODIUM 5000 UNIT(S): 5000 INJECTION INTRAVENOUS; SUBCUTANEOUS at 05:33

## 2019-04-19 RX ADMIN — OXYCODONE HYDROCHLORIDE 10 MILLIGRAM(S): 5 TABLET ORAL at 18:15

## 2019-04-19 RX ADMIN — OXYCODONE HYDROCHLORIDE 10 MILLIGRAM(S): 5 TABLET ORAL at 05:33

## 2019-04-19 RX ADMIN — HEPARIN SODIUM 5000 UNIT(S): 5000 INJECTION INTRAVENOUS; SUBCUTANEOUS at 18:11

## 2019-04-19 RX ADMIN — BICALUTAMIDE 50 MILLIGRAM(S): 50 TABLET, FILM COATED ORAL at 11:59

## 2019-04-19 RX ADMIN — SODIUM CHLORIDE 100 MILLILITER(S): 9 INJECTION INTRAMUSCULAR; INTRAVENOUS; SUBCUTANEOUS at 05:34

## 2019-04-19 RX ADMIN — SODIUM CHLORIDE 100 MILLILITER(S): 9 INJECTION INTRAMUSCULAR; INTRAVENOUS; SUBCUTANEOUS at 13:43

## 2019-04-19 RX ADMIN — ONDANSETRON 4 MILLIGRAM(S): 8 TABLET, FILM COATED ORAL at 11:55

## 2019-04-19 RX ADMIN — OXYCODONE HYDROCHLORIDE 10 MILLIGRAM(S): 5 TABLET ORAL at 07:44

## 2019-04-19 NOTE — PHYSICAL THERAPY INITIAL EVALUATION ADULT - BED MOBILITY TRAINING, PT EVAL
Pt will independently perform all aspects of bed mobility to help prevent pressure ulcers, by 8 weeks

## 2019-04-19 NOTE — PROGRESS NOTE ADULT - ASSESSMENT
78 year old male with PMH of prostate CA with mets to bone otherwise presenting due to increased back and neck pain and generalized weakness for past 2-3 weeks. Today  feeling extremely weak and having difficulty getting up or caring for himself. Patient lives alone. Patient reports one episode of vomiting this AM.  Denies chest pain, SOB,  fever/chills or headache. Patient admitted for pain control and weakness.

## 2019-04-19 NOTE — PROGRESS NOTE ADULT - SUBJECTIVE AND OBJECTIVE BOX
Patient is a 78y old  Male who presents with a chief complaint of Pain and weakness (18 Apr 2019 18:03)      OVERNIGHT EVENTS: back pain    REVIEW OF SYSTEMS: denies chest pain/SOB, diaphoresis, no F/C, cough, dizziness, headache, blurry vision, nausea, vomiting, abdominal pain. All others review of systems negative     MEDICATIONS  (STANDING):  bicalutamide 50 milliGRAM(s) Oral daily  heparin  Injectable 5000 Unit(s) SubCutaneous every 12 hours  metoprolol tartrate 50 milliGRAM(s) Oral two times a day  oxyCODONE  ER Tablet 10 milliGRAM(s) Oral every 12 hours  sodium chloride 0.9%. 1000 milliLiter(s) (100 mL/Hr) IV Continuous <Continuous>    MEDICATIONS  (PRN):  morphine  - Injectable 2 milliGRAM(s) IV Push every 4 hours PRN Severe Pain (7 - 10)  ondansetron Injectable 4 milliGRAM(s) IV Push every 6 hours PRN Nausea and/or Vomiting      Allergies    No Known Allergies    Intolerances        T(F): 96.8 (04-19-19 @ 11:49), Max: 97.3 (04-19-19 @ 05:00)  HR: 97 (04-19-19 @ 12:50) (91 - 105)  BP: 124/78 (04-19-19 @ 12:50) (92/57 - 126/73)  RR: 16 (04-19-19 @ 12:50) (16 - 16)  SpO2: 97% (04-19-19 @ 12:50) (95% - 97%)  Wt(kg): --    PHYSICAL EXAM:  GENERAL: NAD, well-groomed, well-developed  HEAD:  Atraumatic, Normocephalic  EYES: EOMI, PERRLA, conjunctiva and sclera clear  ENMT: No tonsillar erythema, exudates, or enlargement; Moist mucous membranes, Good dentition, No lesions  NECK: Supple, No JVD, Normal thyroid  NERVOUS SYSTEM:  Alert & Oriented X3, Good concentration; Motor Strength 4/5 B/L upper and lower extremities; DTRs 2+ intact and symmetric  CHEST/LUNG: Clear to percussion bilaterally; No rales, rhonchi, wheezing, or rubs BL  HEART: Regular rate and rhythm; No murmurs, rubs, or gallops  ABDOMEN: Soft, Nontender, Nondistended; Bowel sounds present  EXTREMITIES:  2+ Peripheral Pulses, No clubbing, cyanosis, or edema BL LE  LYMPH: No lymphadenopathy noted  SKIN: No rashes or lesions    LABS:                        12.7   13.83 )-----------( 240      ( 19 Apr 2019 08:44 )             40.2     04-19    136  |  108  |  72<H>  ----------------------------<  101<H>  4.2   |  18<L>  |  1.79<H>    Ca    9.4      19 Apr 2019 08:44    TPro  7.0  /  Alb  3.2<L>  /  TBili  1.4<H>  /  DBili  x   /  AST  25  /  ALT  19  /  AlkPhos  501<H>  04-18    PT/INR - ( 18 Apr 2019 14:27 )   PT: 14.4 sec;   INR: 1.28 ratio         PTT - ( 18 Apr 2019 14:27 )  PTT:25.1 sec    Cultures;   CAPILLARY BLOOD GLUCOSE        Lipid panel:           RADIOLOGY & ADDITIONAL TESTS:    Imaging Personally Reviewed:  [x ] YES    < from: Xray Chest 1 View-PORTABLE IMMEDIATE (04.18.19 @ 17:18) >  Limited by rotation. Suspect small bilateral pleural   effusions which are seen on the CT scan of the spine of earlier in the   day. The known rib fractures are not apparent on this study    < end of copied text >      Consultant(s) Notes Reviewed:  [x ] YES     Care Discussed with [ x] Consultants [X ] Patient [x ] Family  [x ]    [x ]  Other; RN

## 2019-04-20 LAB
ANION GAP SERPL CALC-SCNC: 10 MMOL/L — SIGNIFICANT CHANGE UP (ref 5–17)
BUN SERPL-MCNC: 63 MG/DL — HIGH (ref 7–23)
CALCIUM SERPL-MCNC: 9.3 MG/DL — SIGNIFICANT CHANGE UP (ref 8.5–10.1)
CHLORIDE SERPL-SCNC: 111 MMOL/L — HIGH (ref 96–108)
CO2 SERPL-SCNC: 18 MMOL/L — LOW (ref 22–31)
CREAT SERPL-MCNC: 1.73 MG/DL — HIGH (ref 0.5–1.3)
GLUCOSE SERPL-MCNC: 97 MG/DL — SIGNIFICANT CHANGE UP (ref 70–99)
HCT VFR BLD CALC: 39.3 % — SIGNIFICANT CHANGE UP (ref 39–50)
HGB BLD-MCNC: 12.3 G/DL — LOW (ref 13–17)
MCHC RBC-ENTMCNC: 28.3 PG — SIGNIFICANT CHANGE UP (ref 27–34)
MCHC RBC-ENTMCNC: 31.3 GM/DL — LOW (ref 32–36)
MCV RBC AUTO: 90.6 FL — SIGNIFICANT CHANGE UP (ref 80–100)
NRBC # BLD: 0 /100 WBCS — SIGNIFICANT CHANGE UP (ref 0–0)
PLATELET # BLD AUTO: 242 K/UL — SIGNIFICANT CHANGE UP (ref 150–400)
POTASSIUM SERPL-MCNC: 4.3 MMOL/L — SIGNIFICANT CHANGE UP (ref 3.5–5.3)
POTASSIUM SERPL-SCNC: 4.3 MMOL/L — SIGNIFICANT CHANGE UP (ref 3.5–5.3)
RBC # BLD: 4.34 M/UL — SIGNIFICANT CHANGE UP (ref 4.2–5.8)
RBC # FLD: 22.9 % — HIGH (ref 10.3–14.5)
SODIUM SERPL-SCNC: 139 MMOL/L — SIGNIFICANT CHANGE UP (ref 135–145)
WBC # BLD: 10.88 K/UL — HIGH (ref 3.8–10.5)
WBC # FLD AUTO: 10.88 K/UL — HIGH (ref 3.8–10.5)

## 2019-04-20 PROCEDURE — 99233 SBSQ HOSP IP/OBS HIGH 50: CPT

## 2019-04-20 RX ORDER — SODIUM CHLORIDE 9 MG/ML
1000 INJECTION INTRAMUSCULAR; INTRAVENOUS; SUBCUTANEOUS
Qty: 0 | Refills: 0 | Status: DISCONTINUED | OUTPATIENT
Start: 2019-04-20 | End: 2019-04-22

## 2019-04-20 RX ADMIN — OXYCODONE HYDROCHLORIDE 10 MILLIGRAM(S): 5 TABLET ORAL at 06:00

## 2019-04-20 RX ADMIN — SODIUM CHLORIDE 75 MILLILITER(S): 9 INJECTION INTRAMUSCULAR; INTRAVENOUS; SUBCUTANEOUS at 11:30

## 2019-04-20 RX ADMIN — OXYCODONE HYDROCHLORIDE 10 MILLIGRAM(S): 5 TABLET ORAL at 05:23

## 2019-04-20 RX ADMIN — HEPARIN SODIUM 5000 UNIT(S): 5000 INJECTION INTRAVENOUS; SUBCUTANEOUS at 05:23

## 2019-04-20 RX ADMIN — HEPARIN SODIUM 5000 UNIT(S): 5000 INJECTION INTRAVENOUS; SUBCUTANEOUS at 17:11

## 2019-04-20 RX ADMIN — Medication 50 MILLIGRAM(S): at 05:23

## 2019-04-20 RX ADMIN — Medication 50 MILLIGRAM(S): at 17:09

## 2019-04-20 RX ADMIN — BICALUTAMIDE 50 MILLIGRAM(S): 50 TABLET, FILM COATED ORAL at 11:34

## 2019-04-20 RX ADMIN — OXYCODONE HYDROCHLORIDE 10 MILLIGRAM(S): 5 TABLET ORAL at 17:09

## 2019-04-20 RX ADMIN — SODIUM CHLORIDE 100 MILLILITER(S): 9 INJECTION INTRAMUSCULAR; INTRAVENOUS; SUBCUTANEOUS at 00:09

## 2019-04-20 NOTE — PROGRESS NOTE ADULT - SUBJECTIVE AND OBJECTIVE BOX
Patient is a 78y old  Male who presents with a chief complaint of Pain and weakness (19 Apr 2019 16:16)      INTERVAL HPI/OVERNIGHT EVENTS: no new symptoms reported, pt reports appetite is a little better but he is still not eating much. pain is well controlled unless he moves. PT is working with him.    MEDICATIONS  (STANDING):  bicalutamide 50 milliGRAM(s) Oral daily  heparin  Injectable 5000 Unit(s) SubCutaneous every 12 hours  metoprolol tartrate 50 milliGRAM(s) Oral two times a day  oxyCODONE  ER Tablet 10 milliGRAM(s) Oral every 12 hours  sodium chloride 0.9%. 1000 milliLiter(s) (75 mL/Hr) IV Continuous <Continuous>    MEDICATIONS  (PRN):  morphine  - Injectable 2 milliGRAM(s) IV Push every 4 hours PRN Severe Pain (7 - 10)  ondansetron Injectable 4 milliGRAM(s) IV Push every 6 hours PRN Nausea and/or Vomiting      Allergies    No Known Allergies    Intolerances        REVIEW OF SYSTEMS:  CONSTITUTIONAL:  No fever   SKIN:  No rash   CARDIOVASCULAR:  No chest pain, chest pressure or chest discomfort. No palpitations or edema.  RESPIRATORY:  No shortness of breath, cough or sputum, no wheezing  GASTROINTESTINAL:  No nausea, vomiting or diarrhea. No abdominal pain  GENITOURINARY:  No Burning on urination.  NEUROLOGICAL:  No dizziness, or syncope  MUSCULOSKELETAL:  +pain  HEMATOLOGIC:  No bleeding or bruising.      Vital Signs Last 24 Hrs  T(C): 36.5 (20 Apr 2019 04:33), Max: 36.6 (20 Apr 2019 00:03)  T(F): 97.7 (20 Apr 2019 04:33), Max: 97.8 (20 Apr 2019 00:03)  HR: 108 (20 Apr 2019 04:33) (97 - 108)  BP: 122/84 (20 Apr 2019 04:33) (114/71 - 154/88)  BP(mean): --  RR: 19 (20 Apr 2019 04:33) (16 - 19)  SpO2: 94% (20 Apr 2019 04:33) (93% - 97%)    PHYSICAL EXAM:  Constitutional: NAD     Eyes: PERRLA, Normal sclera/conjunctiva    Neck: supple no JVD, no palpable adenopathy    Breasts: deferred    Back: unremarkable    Respiratory: CTA B/L, no wheezing, or rhonci    Cardiovascular: S1, S2 IRR, systolic murmur, rubs or gallops, Radial pulses +2 bilaterally (pt is aware of his cardiac findings)    Gastrointestinal: soft, non-tender, + Bowel sounds, no rebound or guarding    Genitourinary: deferred    Rectal: deferred    Extremities: non-tender, no edema, no calf tenderness bilaterally    Neurological: AAO x 3 no focal deficits    Skin: no rashes noted    Musculoskeletal: normal ROM grossly    Psychiatric: appropriate affect, normal speech     LABS:                        12.3   10.88 )-----------( 242      ( 20 Apr 2019 08:02 )             39.3     04-20    139  |  111<H>  |  63<H>  ----------------------------<  97  4.3   |  18<L>  |  1.73<H>    Ca    9.3      20 Apr 2019 08:02    TPro  7.0  /  Alb  3.2<L>  /  TBili  1.4<H>  /  DBili  x   /  AST  25  /  ALT  19  /  AlkPhos  501<H>  04-18    PT/INR - ( 18 Apr 2019 14:27 )   PT: 14.4 sec;   INR: 1.28 ratio         PTT - ( 18 Apr 2019 14:27 )  PTT:25.1 sec    CAPILLARY BLOOD GLUCOSE      Mild stable Hemoglobin - continue to monitor, outpatient Hem/Onc followup    RADIOLOGY & ADDITIONAL TESTS:    Imaging Personally Reviewed:  [ X] YES  [ ] NO    Consultant(s) Notes Reviewed:  [ X] YES  [ ] NO    Care Discussed with Consultants/Other Providers [X ] YES  [ ] NO

## 2019-04-21 LAB
ALBUMIN SERPL ELPH-MCNC: 3 G/DL — LOW (ref 3.3–5)
ALP SERPL-CCNC: 391 U/L — HIGH (ref 40–120)
ALT FLD-CCNC: 14 U/L — SIGNIFICANT CHANGE UP (ref 12–78)
ANION GAP SERPL CALC-SCNC: 10 MMOL/L — SIGNIFICANT CHANGE UP (ref 5–17)
AST SERPL-CCNC: 18 U/L — SIGNIFICANT CHANGE UP (ref 15–37)
BILIRUB SERPL-MCNC: 1.2 MG/DL — SIGNIFICANT CHANGE UP (ref 0.2–1.2)
BUN SERPL-MCNC: 60 MG/DL — HIGH (ref 7–23)
CALCIUM SERPL-MCNC: 9.5 MG/DL — SIGNIFICANT CHANGE UP (ref 8.5–10.1)
CHLORIDE SERPL-SCNC: 112 MMOL/L — HIGH (ref 96–108)
CO2 SERPL-SCNC: 17 MMOL/L — LOW (ref 22–31)
CREAT SERPL-MCNC: 1.81 MG/DL — HIGH (ref 0.5–1.3)
GLUCOSE SERPL-MCNC: 97 MG/DL — SIGNIFICANT CHANGE UP (ref 70–99)
HCT VFR BLD CALC: 41.8 % — SIGNIFICANT CHANGE UP (ref 39–50)
HGB BLD-MCNC: 12.9 G/DL — LOW (ref 13–17)
MCHC RBC-ENTMCNC: 28.2 PG — SIGNIFICANT CHANGE UP (ref 27–34)
MCHC RBC-ENTMCNC: 30.9 GM/DL — LOW (ref 32–36)
MCV RBC AUTO: 91.3 FL — SIGNIFICANT CHANGE UP (ref 80–100)
NRBC # BLD: 0 /100 WBCS — SIGNIFICANT CHANGE UP (ref 0–0)
PLATELET # BLD AUTO: 245 K/UL — SIGNIFICANT CHANGE UP (ref 150–400)
POTASSIUM SERPL-MCNC: 4.1 MMOL/L — SIGNIFICANT CHANGE UP (ref 3.5–5.3)
POTASSIUM SERPL-SCNC: 4.1 MMOL/L — SIGNIFICANT CHANGE UP (ref 3.5–5.3)
PROT SERPL-MCNC: 6.7 GM/DL — SIGNIFICANT CHANGE UP (ref 6–8.3)
RBC # BLD: 4.58 M/UL — SIGNIFICANT CHANGE UP (ref 4.2–5.8)
RBC # FLD: 23.8 % — HIGH (ref 10.3–14.5)
SODIUM SERPL-SCNC: 139 MMOL/L — SIGNIFICANT CHANGE UP (ref 135–145)
WBC # BLD: 9.88 K/UL — SIGNIFICANT CHANGE UP (ref 3.8–10.5)
WBC # FLD AUTO: 9.88 K/UL — SIGNIFICANT CHANGE UP (ref 3.8–10.5)

## 2019-04-21 PROCEDURE — 99233 SBSQ HOSP IP/OBS HIGH 50: CPT

## 2019-04-21 RX ADMIN — HEPARIN SODIUM 5000 UNIT(S): 5000 INJECTION INTRAVENOUS; SUBCUTANEOUS at 17:58

## 2019-04-21 RX ADMIN — SODIUM CHLORIDE 75 MILLILITER(S): 9 INJECTION INTRAMUSCULAR; INTRAVENOUS; SUBCUTANEOUS at 00:00

## 2019-04-21 RX ADMIN — OXYCODONE HYDROCHLORIDE 10 MILLIGRAM(S): 5 TABLET ORAL at 06:50

## 2019-04-21 RX ADMIN — Medication 50 MILLIGRAM(S): at 17:56

## 2019-04-21 RX ADMIN — OXYCODONE HYDROCHLORIDE 10 MILLIGRAM(S): 5 TABLET ORAL at 17:56

## 2019-04-21 RX ADMIN — HEPARIN SODIUM 5000 UNIT(S): 5000 INJECTION INTRAVENOUS; SUBCUTANEOUS at 05:38

## 2019-04-21 RX ADMIN — OXYCODONE HYDROCHLORIDE 10 MILLIGRAM(S): 5 TABLET ORAL at 18:56

## 2019-04-21 RX ADMIN — BICALUTAMIDE 50 MILLIGRAM(S): 50 TABLET, FILM COATED ORAL at 15:50

## 2019-04-21 RX ADMIN — OXYCODONE HYDROCHLORIDE 10 MILLIGRAM(S): 5 TABLET ORAL at 18:55

## 2019-04-21 RX ADMIN — OXYCODONE HYDROCHLORIDE 10 MILLIGRAM(S): 5 TABLET ORAL at 05:38

## 2019-04-21 RX ADMIN — Medication 50 MILLIGRAM(S): at 05:38

## 2019-04-21 NOTE — DIETITIAN INITIAL EVALUATION ADULT. - PROBLEM SELECTOR PLAN 1
Admit  Pain management  Palliative Care Consult, Dr Pena pending  Dr Aamir Ferreira Urologist  Dr Deo Forman Oncology  Follow up with Dr Jimenez  [phone] in am for medication list.

## 2019-04-21 NOTE — DIETITIAN INITIAL EVALUATION ADULT. - FACTORS AFF FOOD INTAKE
pain/persistent nausea/persistent lack of appetite/persistent constipation/difficulty chewing/change in sense of smell or taste/pt c missing teeth, dentures left @ home, able to eat soft foods, requested ground vegetable

## 2019-04-21 NOTE — DIETITIAN INITIAL EVALUATION ADULT. - NS AS NUTRI INTERV ED CONTENT
Purpose of the nutrition education/Educate pt on consumption of 6 small meals , snacks , nutritional supplement as tolerated

## 2019-04-21 NOTE — CHART NOTE - NSCHARTNOTEFT_GEN_A_CORE
Upon Nutritional Assessment by the Registered Dietitian your patient was determined to meet criteria / has evidence of the following diagnosis/diagnoses:          [ ]  Mild Protein Calorie Malnutrition        [ ]  Moderate Protein Calorie Malnutrition        [x ] Severe Protein Calorie Malnutrition        [ ] Unspecified Protein Calorie Malnutrition        [ ] Underweight / BMI <19        [ ] Morbid Obesity / BMI > 40      Findings as based on:  •  Comprehensive nutrition assessment and consultation  •  Calorie counts (nutrient intake analysis)  •  Food acceptance and intake status from observations by staff  •  Follow up  •  Patient education  •  Intervention secondary to interdisciplinary rounds  •   concerns      Treatment:    The following diet has been recommended:  Low sodium , Dysphagia 3 Soft - Thin Liquids( ground vegetables) +Ensure Clear 8 oz 2x/day (480 shlya, 16 gm pro) , will provide food preferences       PROVIDER Section:     By signing this assessment you are acknowledging and agree with the diagnosis/diagnoses assigned by the Registered Dietitian    Comments:

## 2019-04-21 NOTE — DIETITIAN INITIAL EVALUATION ADULT. - NS AS NUTRI INTERV MEALS SNACK
[place] - modified diet/Recommend Low sodium diet, will provide food preferences [place] - modified diet/Recommend Low sodium diet, Dysphagia 3 Soft - Thin Liquids with ground vegetables, will provide food preferences

## 2019-04-21 NOTE — DIETITIAN INITIAL EVALUATION ADULT. - PERTINENT LABORATORY DATA
04-21  Hgb 12.9 g/dL<L> Hct 41.8 % 04-21 Na139 mmol/L Glu 97 mg/dL K+ 4.1 mmol/L Cr  1.81 mg/dL<H> BUN 60 mg/dL<H> 04-21 Alb 3.0 g/dL<L>04-21 ALT 14 U/L AST 18 U/L Alkaline Phosphatase 391 U/L<H>

## 2019-04-21 NOTE — DIETITIAN INITIAL EVALUATION ADULT. - PROBLEM SELECTOR PLAN 4
Admit to telemetry  Start Metoprolol Q12H  Patient reported he does not take Warfarin anymore because Dr Jimenez stopped it.

## 2019-04-21 NOTE — DIETITIAN INITIAL EVALUATION ADULT. - OTHER INFO
Pt seen due to MD consult.  Pt c basin on the bed due to nausea.  Pt reports that he was mostly eating small amounts of fast food and Ensure or Boost PTA.  Pt unsure of amount of wt. loss, as per adm wt., pt c wt. loss of 51 LBS, ? time frame of wt. loss provided.  Pt requested Jello 1 x day, and receptive to trying Ensure Clear @ present.  Will add prune juice daily for c/o constipation PTA.  Pt c NILDA.

## 2019-04-21 NOTE — PROGRESS NOTE ADULT - SUBJECTIVE AND OBJECTIVE BOX
Patient is a 78y old  Male who presents with a chief complaint of Pain and weakness (20 Apr 2019 10:35)      INTERVAL HPI/OVERNIGHT EVENTS: no new symptoms reported. pt has opted for comfort measures and is awaiting hospice eval.    MEDICATIONS  (STANDING):  bicalutamide 50 milliGRAM(s) Oral daily  heparin  Injectable 5000 Unit(s) SubCutaneous every 12 hours  metoprolol tartrate 50 milliGRAM(s) Oral two times a day  oxyCODONE  ER Tablet 10 milliGRAM(s) Oral every 12 hours  sodium chloride 0.9%. 1000 milliLiter(s) (75 mL/Hr) IV Continuous <Continuous>    MEDICATIONS  (PRN):  morphine  - Injectable 2 milliGRAM(s) IV Push every 4 hours PRN Severe Pain (7 - 10)  ondansetron Injectable 4 milliGRAM(s) IV Push every 6 hours PRN Nausea and/or Vomiting      Allergies    No Known Allergies    Intolerances        REVIEW OF SYSTEMS:  CONSTITUTIONAL:  No fever, chills, significant weakness or significant fatigue.  HEENT:  Eyes:  No pain or redness in the eyes. Ears, Nose, Throat:  No runny nose or sore throat.  SKIN:  No rash   CARDIOVASCULAR:  No chest pain, chest pressure or chest discomfort. No palpitations or edema.  RESPIRATORY:  No shortness of breath, cough or sputum, no wheezing  GASTROINTESTINAL:  No nausea, vomiting or diarrhea. No abdominal pain  GENITOURINARY:  No Burning on urination. no trouble  urinating and no urinary symptoms  NEUROLOGICAL:  No dizziness, or syncope  HEMATOLOGIC:  No bleeding or bruising.    Vital Signs Last 24 Hrs  T(C): 35.9 (21 Apr 2019 05:20), Max: 36.9 (20 Apr 2019 12:10)  T(F): 96.7 (21 Apr 2019 05:20), Max: 98.4 (20 Apr 2019 12:10)  HR: 107 (21 Apr 2019 05:20) (80 - 107)  BP: 124/71 (21 Apr 2019 05:20) (121/75 - 130/84)  BP(mean): --  RR: 18 (21 Apr 2019 05:20) (17 - 18)  SpO2: 95% (21 Apr 2019 05:20) (93% - 97%)    PHYSICAL EXAM:  Constitutional: NAD     Eyes: PERRLA, Normal sclera/conjunctiva    Neck: supple no JVD, no palpable adenopathy    Breasts: deferred    Back: unremarkable    Respiratory: CTA B/L, no wheezing, or rhonci    Cardiovascular: S1, S2 no murmurs, rubs or gallops, Radial pulses +2 bilaterally    Gastrointestinal: soft, non-tender, + Bowel sounds, no rebound or guarding    Genitourinary: deferred    Rectal: deferred    Extremities: non-tender, no edema, no calf tenderness bilaterally    Neurological: AAO x 3 no focal deficits    Skin: no rashes noted    Musculoskeletal: normal ROM grossly    Psychiatric: appropriate affect, normal speech     LABS:                        12.9   9.88  )-----------( 245      ( 21 Apr 2019 07:55 )             41.8     04-21    139  |  112<H>  |  60<H>  ----------------------------<  97  4.1   |  17<L>  |  1.81<H>    Ca    9.5      21 Apr 2019 07:55    TPro  6.7  /  Alb  3.0<L>  /  TBili  1.2  /  DBili  x   /  AST  18  /  ALT  14  /  AlkPhos  391<H>  04-21        CAPILLARY BLOOD GLUCOSE    Labs are stable      RADIOLOGY & ADDITIONAL TESTS:    Imaging Personally Reviewed:  [ X] YES  [ ] NO    Consultant(s) Notes Reviewed:  [ X] YES  [ ] NO    Care Discussed with Consultants/Other Providers [X ] YES  [ ] NO

## 2019-04-21 NOTE — DIETITIAN INITIAL EVALUATION ADULT. - ENERGY NEEDS
Height (cm): 182.88 (04-18)  Weight (kg): 81 (04-18)  BMI (kg/m2): 24.2 (04-18)  IBW:  80.7 kg         % IBW:   100%          UBW: 104.3 kg             %UBW: 77%, wt. gain of 4.8 kg/5.9% noted since adm, ?, no edema noted

## 2019-04-21 NOTE — DIETITIAN INITIAL EVALUATION ADULT. - PHYSICAL APPEARANCE
BMI= 24.2(04/18), Nutrition focused physical exam conducted; Subcutaneous fat Exam;  [ Moderate  ]  Orbital fat pads region,  [ Unable ]Buccal fat region,  [ Moderate ]triceps region, [ WNL  ]ribs region.  Muscle Exam; [ Moderate  ]temples region, [ Moderate  ]clavicle region, [ Moderate  ]shoulder region, [ Unable  ]Scapula region, [ Moderate  ]Interosseous region, [ Moderate  ]thigh region, [ Moderate  ]Calf region/other (specify)/underweight/debilitated

## 2019-04-21 NOTE — DIETITIAN INITIAL EVALUATION ADULT. - PROBLEM SELECTOR PROBLEM 1
Patient ambulatory to ED treatment area with steady gait for complaint of \"I have a migraine for about four days and nothing seems to be helping it. \" Patient reports taking her prescribed migraine medication without relief. Patient does not take daily migraine medication. Patient reports light sensitivity. Patient denies nausea, vomiting. Patient reporting abdominal pain \"off and on abdominal pain for the past few weeks. \" Patient reports some diarrhea.
Prostate cancer metastatic to bone

## 2019-04-21 NOTE — DIETITIAN INITIAL EVALUATION ADULT. - PERTINENT MEDS FT
MEDICATIONS  (STANDING):  bicalutamide 50 milliGRAM(s) Oral daily  heparin  Injectable 5000 Unit(s) SubCutaneous every 12 hours  metoprolol tartrate 50 milliGRAM(s) Oral two times a day  oxyCODONE  ER Tablet 10 milliGRAM(s) Oral every 12 hours  sodium chloride 0.9%. 1000 milliLiter(s) (75 mL/Hr) IV Continuous <Continuous>    MEDICATIONS  (PRN):  morphine  - Injectable 2 milliGRAM(s) IV Push every 4 hours PRN Severe Pain (7 - 10)  ondansetron Injectable 4 milliGRAM(s) IV Push every 6 hours PRN Nausea and/or Vomiting

## 2019-04-21 NOTE — DIETITIAN INITIAL EVALUATION ADULT. - ETIOLOGY
inadequate protein-energy intake in setting of altered GI function, nausea, constipation, altered chew, pain, NILDA, social hx of living alone, inability to manage self care, prostate cancer c mets

## 2019-04-22 DIAGNOSIS — E43 UNSPECIFIED SEVERE PROTEIN-CALORIE MALNUTRITION: ICD-10-CM

## 2019-04-22 PROCEDURE — 99233 SBSQ HOSP IP/OBS HIGH 50: CPT

## 2019-04-22 RX ORDER — SODIUM CHLORIDE 9 MG/ML
1000 INJECTION INTRAMUSCULAR; INTRAVENOUS; SUBCUTANEOUS
Qty: 0 | Refills: 0 | Status: DISCONTINUED | OUTPATIENT
Start: 2019-04-22 | End: 2019-04-27

## 2019-04-22 RX ADMIN — OXYCODONE HYDROCHLORIDE 10 MILLIGRAM(S): 5 TABLET ORAL at 17:30

## 2019-04-22 RX ADMIN — OXYCODONE HYDROCHLORIDE 10 MILLIGRAM(S): 5 TABLET ORAL at 06:02

## 2019-04-22 RX ADMIN — OXYCODONE HYDROCHLORIDE 10 MILLIGRAM(S): 5 TABLET ORAL at 06:42

## 2019-04-22 RX ADMIN — OXYCODONE HYDROCHLORIDE 10 MILLIGRAM(S): 5 TABLET ORAL at 17:17

## 2019-04-22 RX ADMIN — BICALUTAMIDE 50 MILLIGRAM(S): 50 TABLET, FILM COATED ORAL at 13:21

## 2019-04-22 RX ADMIN — HEPARIN SODIUM 5000 UNIT(S): 5000 INJECTION INTRAVENOUS; SUBCUTANEOUS at 06:02

## 2019-04-22 RX ADMIN — SODIUM CHLORIDE 30 MILLILITER(S): 9 INJECTION INTRAMUSCULAR; INTRAVENOUS; SUBCUTANEOUS at 13:42

## 2019-04-22 RX ADMIN — HEPARIN SODIUM 5000 UNIT(S): 5000 INJECTION INTRAVENOUS; SUBCUTANEOUS at 17:17

## 2019-04-22 RX ADMIN — Medication 50 MILLIGRAM(S): at 17:17

## 2019-04-22 RX ADMIN — Medication 50 MILLIGRAM(S): at 06:02

## 2019-04-22 RX ADMIN — SODIUM CHLORIDE 75 MILLILITER(S): 9 INJECTION INTRAMUSCULAR; INTRAVENOUS; SUBCUTANEOUS at 01:42

## 2019-04-22 NOTE — PROGRESS NOTE ADULT - SUBJECTIVE AND OBJECTIVE BOX
Patient is a 78y old  Male who presents with a chief complaint of Pain and weakness (21 Apr 2019 11:58)      OVERNIGHT EVENTS:  none     REVIEW OF SYSTEMS: denies chest pain/SOB, diaphoresis, no F/C, cough, dizziness, headache, blurry vision, nausea, vomiting, abdominal pain. All others review of systems negative     MEDICATIONS  (STANDING):  bicalutamide 50 milliGRAM(s) Oral daily  heparin  Injectable 5000 Unit(s) SubCutaneous every 12 hours  metoprolol tartrate 50 milliGRAM(s) Oral two times a day  oxyCODONE  ER Tablet 10 milliGRAM(s) Oral every 12 hours  sodium chloride 0.9%. 1000 milliLiter(s) (75 mL/Hr) IV Continuous <Continuous>    MEDICATIONS  (PRN):  morphine  - Injectable 2 milliGRAM(s) IV Push every 4 hours PRN Severe Pain (7 - 10)  ondansetron Injectable 4 milliGRAM(s) IV Push every 6 hours PRN Nausea and/or Vomiting      Allergies    No Known Allergies    Intolerances        T(F): 96.5 (04-22-19 @ 04:44), Max: 96.9 (04-21-19 @ 12:36)  HR: 85 (04-22-19 @ 04:44) (85 - 102)  BP: 132/72 (04-22-19 @ 04:44) (105/67 - 132/72)  RR: 18 (04-22-19 @ 04:44) (17 - 18)  SpO2: 97% (04-22-19 @ 04:44) (95% - 97%)  Wt(kg): --    PHYSICAL EXAM:  GENERAL: NAD, well-groomed, well-developed  HEAD:  Atraumatic, Normocephalic  EYES: EOMI, PERRLA, conjunctiva and sclera clear  ENMT: No tonsillar erythema, exudates, or enlargement; Moist mucous membranes, Good dentition, No lesions  NECK: Supple, No JVD, Normal thyroid  NERVOUS SYSTEM:  Alert & Oriented X3, Good concentration; Motor Strength 4/5 B/L upper and lower extremities; DTRs 2+ intact and symmetric  CHEST/LUNG: Clear to percussion bilaterally; No rales, rhonchi, wheezing, or rubs BL  HEART: Regular rate and rhythm; No murmurs, rubs, or gallops  ABDOMEN: Soft, Nontender, Nondistended; Bowel sounds present  EXTREMITIES:  + Peripheral cyanosis BL LE  LYMPH: No lymphadenopathy noted  SKIN: No rashes or lesions    LABS:                        12.9   9.88  )-----------( 245      ( 21 Apr 2019 07:55 )             41.8     04-21    139  |  112<H>  |  60<H>  ----------------------------<  97  4.1   |  17<L>  |  1.81<H>    Ca    9.5      21 Apr 2019 07:55    TPro  6.7  /  Alb  3.0<L>  /  TBili  1.2  /  DBili  x   /  AST  18  /  ALT  14  /  AlkPhos  391<H>  04-21        Cultures;   CAPILLARY BLOOD GLUCOSE      RADIOLOGY & ADDITIONAL TESTS:    Imaging Personally Reviewed:  [ X] YES  [ ] NO  < from: CT 3D Reconstruct w/ Workstation (04.18.19 @ 16:12) >  Diffuse skeletal metastasis entire skeleton.    Pathologic fractures at the spinous processes of C6 and C7, left third,   eighth and 11th ribs.    < end of copied text >    Consultant(s) Notes Reviewed:  [ X] YES  [ ] NO    Care Discussed with Consultants/Other Providers [X ] YES  [ ] NO

## 2019-04-23 ENCOUNTER — TRANSCRIPTION ENCOUNTER (OUTPATIENT)
Age: 79
End: 2019-04-23

## 2019-04-23 PROCEDURE — 99233 SBSQ HOSP IP/OBS HIGH 50: CPT

## 2019-04-23 RX ORDER — WARFARIN SODIUM 2.5 MG/1
1 TABLET ORAL
Qty: 0 | Refills: 0 | COMMUNITY

## 2019-04-23 RX ORDER — LISINOPRIL 2.5 MG/1
1 TABLET ORAL
Qty: 0 | Refills: 0 | COMMUNITY

## 2019-04-23 RX ORDER — METOPROLOL TARTRATE 50 MG
1 TABLET ORAL
Qty: 0 | Refills: 0 | COMMUNITY
Start: 2019-04-23

## 2019-04-23 RX ORDER — ABIRATERONE ACETATE 250 MG/1
2 TABLET ORAL
Qty: 0 | Refills: 0 | COMMUNITY

## 2019-04-23 RX ORDER — METOPROLOL TARTRATE 50 MG
1 TABLET ORAL
Qty: 0 | Refills: 0 | COMMUNITY

## 2019-04-23 RX ORDER — OXYCODONE HYDROCHLORIDE 5 MG/1
1 TABLET ORAL
Qty: 0 | Refills: 0 | COMMUNITY
Start: 2019-04-23

## 2019-04-23 RX ADMIN — Medication 50 MILLIGRAM(S): at 17:24

## 2019-04-23 RX ADMIN — OXYCODONE HYDROCHLORIDE 10 MILLIGRAM(S): 5 TABLET ORAL at 17:24

## 2019-04-23 RX ADMIN — Medication 50 MILLIGRAM(S): at 05:26

## 2019-04-23 RX ADMIN — OXYCODONE HYDROCHLORIDE 10 MILLIGRAM(S): 5 TABLET ORAL at 06:06

## 2019-04-23 RX ADMIN — HEPARIN SODIUM 5000 UNIT(S): 5000 INJECTION INTRAVENOUS; SUBCUTANEOUS at 05:26

## 2019-04-23 RX ADMIN — OXYCODONE HYDROCHLORIDE 10 MILLIGRAM(S): 5 TABLET ORAL at 05:26

## 2019-04-23 RX ADMIN — OXYCODONE HYDROCHLORIDE 10 MILLIGRAM(S): 5 TABLET ORAL at 18:25

## 2019-04-23 RX ADMIN — BICALUTAMIDE 50 MILLIGRAM(S): 50 TABLET, FILM COATED ORAL at 11:17

## 2019-04-23 RX ADMIN — HEPARIN SODIUM 5000 UNIT(S): 5000 INJECTION INTRAVENOUS; SUBCUTANEOUS at 17:24

## 2019-04-23 NOTE — DISCHARGE NOTE PROVIDER - NSDCCPCAREPLAN_GEN_ALL_CORE_FT
PRINCIPAL DISCHARGE DIAGNOSIS  Diagnosis: Functional quadriplegia  Assessment and Plan of Treatment: PT/OT      SECONDARY DISCHARGE DIAGNOSES  Diagnosis: Prostate cancer metastatic to bone  Assessment and Plan of Treatment: Palliative care    Diagnosis: Severe protein-calorie malnutrition  Assessment and Plan of Treatment: Low sodium , Dysphagia 3 Soft - Thin Liquids( ground vegetables) +Ensure Clear 8 oz 2x/day (480 shyla, 16 gm pro).    Diagnosis: Chronic atrial fibrillation  Assessment and Plan of Treatment: Metoprolol Q12H. Patient reported he does not take Warfarin anymore because Dr Jimenez stopped it    Diagnosis: NILDA (acute kidney injury)  Assessment and Plan of Treatment: resolved    Diagnosis: Essential hypertension  Assessment and Plan of Treatment: Metoprolol Q12H    Diagnosis: Back pain  Assessment and Plan of Treatment: pain mgmt PRINCIPAL DISCHARGE DIAGNOSIS  Diagnosis: Prostate cancer metastatic to bone  Assessment and Plan of Treatment: Poor prognosis   Inpatient Hospice.      SECONDARY DISCHARGE DIAGNOSES  Diagnosis: Back pain  Assessment and Plan of Treatment: Chronic Back pain  Secondary to metastatic disease  Functional Quardiplegic status   Analgesics as needed. Supportive care.    Diagnosis: NILDA (acute kidney injury)  Assessment and Plan of Treatment: resolved    Diagnosis: Chronic atrial fibrillation  Assessment and Plan of Treatment: Patient reported he does not take Warfarin anymore because Dr Jimenez stopped it    Diagnosis: Essential hypertension  Assessment and Plan of Treatment: Metoprolol Q12H    Diagnosis: Severe protein-calorie malnutrition  Assessment and Plan of Treatment: Low sodium , Dysphagia 3 Soft - Thin Liquids( ground vegetables) +Ensure Clear 8 oz 2x/day (480 shyla, 16 gm pro).

## 2019-04-23 NOTE — DISCHARGE NOTE PROVIDER - HOSPITAL COURSE
78 year old male with PMH of prostate CA with mets to bone otherwise presenting due to increased back and neck pain and generalized weakness for past 2-3 weeks. Today  feeling extremely weak and having difficulty getting up or caring for himself. Patient lives alone. Patient reports one episode of vomiting this AM.  Denies chest pain, SOB,  fever/chills or headache. Patient admitted for pain control and weakness. Functional quadriplegia received IVF hydration. Fall Precautions. PT Eval -ELIEL. as patient unable to care for self. Lives alone. wife  in . NILDA (acute kidney injury) resolved w/ IVF hydration. Severe protein-calorie malnutrition.  Plan: Low sodium , Dysphagia 3 Soft - Thin Liquids( ground vegetables) +Ensure Clear 8 oz 2x/day (480 shyla, 16 gm pro). Prostate cancer metastatic to bone.  Plan: Pain management. DNR/DNI. Pt does not want any aggressive measures. Palliative Care Consult. Dr Aamir Ferreira Urologist. Dr Deo Forman Oncology. Dr Jimenez  [phone]. Bilateral low back pain without sciatica, unspecified chronicity.  Plan: Oxycontin Q12H. Morphine IVP for breakthrough pain. Chronic atrial fibrillation. Plan: Metoprolol Q12H. Patient reported he does not take Warfarin anymore because Dr Jimenez stopped it. Essential hypertension.  Plan: Metoprolol Q12H. stable for d/c to rehab 78 year old male with PMH of prostate CA with mets to bone otherwise presenting due to increased back and neck pain and generalized weakness for past 2-3 weeks. Today  feeling extremely weak and having difficulty getting up or caring for himself. Patient lives alone. Patient reports one episode of vomiting this AM.  Denies chest pain, SOB,  fever/chills or headache. Patient admitted for pain control and weakness. Functional quadriplegia received IVF hydration. Fall Precautions.  NILDA (acute kidney injury) resolved w/ IVF hydration. Severe protein-calorie malnutrition.  Plan: Low sodium , Dysphagia 3 Soft - Thin Liquids( ground vegetables) +Ensure Clear 8 oz 2x/day (480 shyla, 16 gm pro). Prostate cancer metastatic to bone.  Plan: Pain management. DNR/DNI. Pt does not want any aggressive measures. Palliative Care Consult. Dr Aamir Ferreira Urologist. Dr Deo Forman Oncology. Dr Jimenez  [phone]. Bilateral low back pain without sciatica, unspecified chronicity.  Plan: Oxycontin Q12H. Morphine IVP for breakthrough pain. Chronic atrial fibrillation. Plan: Metoprolol Q12H. Patient reported he does not take Warfarin anymore because Dr Jimenez stopped it. Essential hypertension.  Plan: Metoprolol Q12H. stable for d/c to rehab

## 2019-04-23 NOTE — PROGRESS NOTE ADULT - SUBJECTIVE AND OBJECTIVE BOX
Patient is a 78y old  Male who presents with a chief complaint of Pain and weakness (23 Apr 2019 14:21)      OVERNIGHT EVENTS: none     REVIEW OF SYSTEMS: denies chest pain/SOB, diaphoresis, no F/C, cough, dizziness, headache, blurry vision, nausea, vomiting, abdominal pain. All others review of systems negative     MEDICATIONS  (STANDING):  bicalutamide 50 milliGRAM(s) Oral daily  heparin  Injectable 5000 Unit(s) SubCutaneous every 12 hours  metoprolol tartrate 50 milliGRAM(s) Oral two times a day  oxyCODONE  ER Tablet 10 milliGRAM(s) Oral every 12 hours  sodium chloride 0.9%. 1000 milliLiter(s) (30 mL/Hr) IV Continuous <Continuous>    MEDICATIONS  (PRN):  morphine  - Injectable 2 milliGRAM(s) IV Push every 4 hours PRN Severe Pain (7 - 10)  ondansetron Injectable 4 milliGRAM(s) IV Push every 6 hours PRN Nausea and/or Vomiting      Allergies    No Known Allergies    Intolerances        T(F): 96 (04-23-19 @ 16:46), Max: 97 (04-23-19 @ 12:21)  HR: 103 (04-23-19 @ 16:46) (89 - 103)  BP: 125/84 (04-23-19 @ 16:46) (118/71 - 133/64)  RR: 15 (04-23-19 @ 16:46) (15 - 18)  SpO2: 98% (04-23-19 @ 16:46) (94% - 99%)  Wt(kg): --    PHYSICAL EXAM:  GENERAL: NAD, well-groomed, well-developed  HEAD:  Atraumatic, Normocephalic  EYES: EOMI, PERRLA, conjunctiva and sclera clear  ENMT: No tonsillar erythema, exudates, or enlargement; Moist mucous membranes, Good dentition, No lesions  NECK: Supple, No JVD, Normal thyroid  NERVOUS SYSTEM:  Alert & Oriented X3, Good concentration; Motor Strength 4/5 B/L upper and lower extremities; DTRs 2+ intact and symmetric  CHEST/LUNG: Clear to percussion bilaterally; No rales, rhonchi, wheezing, or rubs BL  HEART: Regular rate and rhythm; No murmurs, rubs, or gallops  ABDOMEN: Soft, Nontender, Nondistended; Bowel sounds present  EXTREMITIES:  + Peripheral cyanosis BL LE  LYMPH: No lymphadenopathy noted  SKIN: No rashes or lesions    LABS:          Cultures;   CAPILLARY BLOOD GLUCOSE        RADIOLOGY & ADDITIONAL TESTS:    Imaging Personally Reviewed:  [ X] YES  [ ] NO  < from: CT 3D Reconstruct w/ Workstation (04.18.19 @ 16:12) >  Diffuse skeletal metastasis entire skeleton.    Pathologic fractures at the spinous processes of C6 and C7, left third,   eighth and 11th ribs.    < end of copied text >    Consultant(s) Notes Reviewed:  [ X] YES  [ ] NO    Care Discussed with Consultants/Other Providers [X ] YES  [ ] NO

## 2019-04-24 PROCEDURE — 99232 SBSQ HOSP IP/OBS MODERATE 35: CPT

## 2019-04-24 RX ADMIN — OXYCODONE HYDROCHLORIDE 10 MILLIGRAM(S): 5 TABLET ORAL at 05:20

## 2019-04-24 RX ADMIN — HEPARIN SODIUM 5000 UNIT(S): 5000 INJECTION INTRAVENOUS; SUBCUTANEOUS at 18:20

## 2019-04-24 RX ADMIN — BICALUTAMIDE 50 MILLIGRAM(S): 50 TABLET, FILM COATED ORAL at 11:19

## 2019-04-24 RX ADMIN — OXYCODONE HYDROCHLORIDE 10 MILLIGRAM(S): 5 TABLET ORAL at 18:19

## 2019-04-24 RX ADMIN — OXYCODONE HYDROCHLORIDE 10 MILLIGRAM(S): 5 TABLET ORAL at 05:58

## 2019-04-24 RX ADMIN — OXYCODONE HYDROCHLORIDE 10 MILLIGRAM(S): 5 TABLET ORAL at 19:19

## 2019-04-24 RX ADMIN — Medication 50 MILLIGRAM(S): at 05:20

## 2019-04-24 RX ADMIN — SODIUM CHLORIDE 30 MILLILITER(S): 9 INJECTION INTRAMUSCULAR; INTRAVENOUS; SUBCUTANEOUS at 04:22

## 2019-04-24 RX ADMIN — HEPARIN SODIUM 5000 UNIT(S): 5000 INJECTION INTRAVENOUS; SUBCUTANEOUS at 05:20

## 2019-04-24 NOTE — PROGRESS NOTE ADULT - SUBJECTIVE AND OBJECTIVE BOX
Patient is a 78y old  Male who presents with a chief complaint of Pain and weakness (23 Apr 2019 19:56)      OVERNIGHT EVENTS:  none     REVIEW OF SYSTEMS: denies chest pain/SOB, diaphoresis, no F/C, cough, dizziness, headache, blurry vision, nausea, vomiting, abdominal pain. All others review of systems negative     MEDICATIONS  (STANDING):  bicalutamide 50 milliGRAM(s) Oral daily  heparin  Injectable 5000 Unit(s) SubCutaneous every 12 hours  metoprolol tartrate 50 milliGRAM(s) Oral two times a day  oxyCODONE  ER Tablet 10 milliGRAM(s) Oral every 12 hours  sodium chloride 0.9%. 1000 milliLiter(s) (30 mL/Hr) IV Continuous <Continuous>    MEDICATIONS  (PRN):  morphine  - Injectable 2 milliGRAM(s) IV Push every 4 hours PRN Severe Pain (7 - 10)  ondansetron Injectable 4 milliGRAM(s) IV Push every 6 hours PRN Nausea and/or Vomiting      Allergies    No Known Allergies    Intolerances        T(F): 98.5 (04-24-19 @ 12:37), Max: 98.5 (04-24-19 @ 12:37)  HR: 107 (04-24-19 @ 12:37) (102 - 107)  BP: 150/84 (04-24-19 @ 12:37) (105/66 - 150/84)  RR: 16 (04-24-19 @ 12:37) (15 - 19)  SpO2: 93% (04-24-19 @ 12:37) (93% - 98%)  Wt(kg): --    PHYSICAL EXAM:  GENERAL: NAD, well-groomed, well-developed  HEAD:  Atraumatic, Normocephalic  EYES: EOMI, PERRLA, conjunctiva and sclera clear  ENMT: No tonsillar erythema, exudates, or enlargement; Moist mucous membranes, Good dentition, No lesions  NECK: Supple, No JVD, Normal thyroid  NERVOUS SYSTEM:  Alert & Oriented X3, Good concentration; Motor Strength 4/5 B/L upper and lower extremities; DTRs 2+ intact and symmetric  CHEST/LUNG: Clear to percussion bilaterally; No rales, rhonchi, wheezing, or rubs BL  HEART: Regular rate and rhythm; No murmurs, rubs, or gallops  ABDOMEN: Soft, Nontender, Nondistended; Bowel sounds present  EXTREMITIES:  + Peripheral cyanosis BL LE  LYMPH: No lymphadenopathy noted  SKIN: No rashes or lesions    LABS:    Cultures;   CAPILLARY BLOOD GLUCOSE        RADIOLOGY & ADDITIONAL TESTS:    Imaging Personally Reviewed:  [ X] YES  [ ] NO  < from: CT 3D Reconstruct w/ Workstation (04.18.19 @ 16:12) >  Diffuse skeletal metastasis entire skeleton.    Pathologic fractures at the spinous processes of C6 and C7, left third,   eighth and 11th ribs.    < end of copied text >    Consultant(s) Notes Reviewed:  [ X] YES  [ ] NO    Care Discussed with Consultants/Other Providers [X ] YES  [ ] NO

## 2019-04-24 NOTE — PROGRESS NOTE ADULT - ASSESSMENT
78 year old male with PMH of prostate CA with mets to bone otherwise presenting due to increased back and neck pain and generalized weakness for past 2-3 weeks. Today  feeling extremely weak and having difficulty getting up or caring for himself. Patient lives alone. Patient reports one episode of vomiting this AM.  Denies chest pain, SOB,  fever/chills or headache. Patient admitted for pain control and weakness. awaiting placement

## 2019-04-25 PROCEDURE — 99232 SBSQ HOSP IP/OBS MODERATE 35: CPT

## 2019-04-25 RX ADMIN — Medication 50 MILLIGRAM(S): at 17:29

## 2019-04-25 RX ADMIN — OXYCODONE HYDROCHLORIDE 10 MILLIGRAM(S): 5 TABLET ORAL at 05:58

## 2019-04-25 RX ADMIN — OXYCODONE HYDROCHLORIDE 10 MILLIGRAM(S): 5 TABLET ORAL at 17:29

## 2019-04-25 RX ADMIN — HEPARIN SODIUM 5000 UNIT(S): 5000 INJECTION INTRAVENOUS; SUBCUTANEOUS at 05:28

## 2019-04-25 RX ADMIN — Medication 50 MILLIGRAM(S): at 05:28

## 2019-04-25 RX ADMIN — ONDANSETRON 4 MILLIGRAM(S): 8 TABLET, FILM COATED ORAL at 17:29

## 2019-04-25 RX ADMIN — OXYCODONE HYDROCHLORIDE 10 MILLIGRAM(S): 5 TABLET ORAL at 05:28

## 2019-04-25 RX ADMIN — HEPARIN SODIUM 5000 UNIT(S): 5000 INJECTION INTRAVENOUS; SUBCUTANEOUS at 17:31

## 2019-04-25 NOTE — PROGRESS NOTE ADULT - PROBLEM SELECTOR PLAN 4
Pain management  Palliative Care Consult, Dr Becky Ferreira Urologist  Dr Deo [facility]  Follow up with Dr Jimenez  [phone]
Pain management  Palliative Care Consult, Dr Becky Ferreira Urologist  Dr Deo Form[facility]  Follow up with Dr Jimenez  [phone]
Pain management  Palliative Care Consult, Dr Becky Ferreira Urologist  Dr Deo Form[facility]  Follow up with Dr Jimenez  [phone]
Pain management  Palliative Care Consult, Dr Becky Ferreira Urologist  Dr Deo Forman On[facility]  Follow up with Dr Jimenez  [phone] in am for medication list.
Pain management  Palliative Care Consult, Dr Becky Ferreira Urologist  Dr Deo Form[facility]  Follow up with Dr Jimenez  [phone] in am for medication list.
Pain management  Palliative Care Consult, Dr Becky Ferreira Urologist  Dr Deo Form[facility] On[facility]  Follow up with Dr Jimenez  [phone] in am for medication list.
Pain management  Palliative Care Consult, Dr Becky Ferreira Urologist  Dr Deo Form[facility] On[facility]  Follow up with Dr Jimenez  [phone]

## 2019-04-25 NOTE — PROGRESS NOTE ADULT - NSHPATTENDINGPLANDISCUSS_GEN_ALL_CORE
Patient and brother RN

## 2019-04-25 NOTE — PROGRESS NOTE ADULT - PROBLEM SELECTOR PLAN 1
IVF hydration  Fall Precautions  PT Eval -ELIEL  Social work consult pending for rehab placement as patient unable to care for self. Lives alone.
IVF hydration  Fall Precautions  PT Eval -ELIEL  Social work: pending for rehab placement as patient unable to care for self. Lives alone.
IVF hydration for maintenance fluids  Fall Precautions  PT Eval -ELIEL  Social work consult pending for rehab placement as patient unable to care for self. Lives alone.
IVF hydration for maintenance fluids  Fall Precautions  PT Sam -ELIEL  Social work is working on the case, plan for possible Hospice, the patient has opted for comfort measures and he does not want any unnecessary labwork
IVF hydration  Fall Precautions  PT Eval -ELIEL  Social work consult pending for rehab placement as patient unable to care for self. Lives alone.

## 2019-04-25 NOTE — PROGRESS NOTE ADULT - PROBLEM SELECTOR PROBLEM 5
Bilateral low back pain without sciatica, unspecified chronicity

## 2019-04-25 NOTE — PROGRESS NOTE ADULT - PROBLEM SELECTOR PLAN 7
Metoprolol Q12H

## 2019-04-25 NOTE — PROGRESS NOTE ADULT - SUBJECTIVE AND OBJECTIVE BOX
HPI:  · 78 year old male with PMH of Atrial Fibrillation (no longer on Coumadin), HTN, Prostate CA with mets to bone otherwise presenting due to increased back and neck pain and generalized weakness for past 2-3 weeks. Today  feeling extremely weak and having difficulty getting up or caring for himself. Patient lives alone. Patient reports one episode of vomiting this AM.  Denies chest pain, SOB,  fever/chills or headache. Patient cannot recall his medications or his Pharmacy. Calls to Dr Jimenez, office closed. Medication list was received from Dr TY ann of 4/12/19. (18 Apr 2019 18:03)  PAST MEDICAL & SURGICAL HISTORY:  HTN (hypertension)  Atrial fibrillation  Bone metastases  Prostate CA        garth ADLs (prior to admission):  Independent [ ] moderately [ ] fully   Dependent   [ ] moderately [ ]fully    	                 T(C): 36.1 (04-25-19 @ 11:55), Max: 36.1 (04-24-19 @ 17:19)  T(F): 96.9 (04-25-19 @ 11:55), Max: 97 (04-24-19 @ 17:19)  HR: 104 (04-25-19 @ 11:55) (89 - 116)  BP: 110/75 (04-25-19 @ 11:55) (110/75 - 133/78)  RR: 18 (04-25-19 @ 11:55) (16 - 18)  SpO2: 96% (04-25-19 @ 11:55) (94% - 99%)  Wt(kg): --      GENERAL:    EYES : non icteric :               Other:     HEENT:      	atraumatic, normocephalic, PEERLA, sclera anicteric, dry oral mucosa   NECK:          Trach:        Vent:  CVS:          Tachypnic:               Bradycardic:              Normal:		   RESP:        tachypnic:                Dyspenic :                  Comfortable:	  GI:          NGT/PEG 	  :      nieves      	  MUSC:       	Normal	Weakness	  Edema	   NEURO:     	No focal deficit	  Focal  PSYCH:           Alert	Oriented	  Lethargic     SKIN:         	Normal	  Other  LYMPH:      	Normal	  Other  	                     ALLERGIES: No Known Allergies    MEDICATIONS  (STANDING):  bicalutamide 50 milliGRAM(s) Oral daily  heparin  Injectable 5000 Unit(s) SubCutaneous every 12 hours  metoprolol tartrate 50 milliGRAM(s) Oral two times a day  oxyCODONE  ER Tablet 10 milliGRAM(s) Oral every 12 hours  sodium chloride 0.9%. 1000 milliLiter(s) (30 mL/Hr) IV Continuous <Continuous>    MEDICATIONS  (PRN):  morphine  - Injectable 2 milliGRAM(s) IV Push every 4 hours PRN Severe Pain (7 - 10)  ondansetron Injectable 4 milliGRAM(s) IV Push every 6 hours PRN Nausea and/or Vomiting    	                   LABS REVIEWED                    	  ADVANCED DIRECTIVES:   FULL CODE [   ]  DNR [ x ]    DNI [ x ]     MOLST x[  ]  PSYCHOSOCIAL-SPIRITUAL ASSESSMENT:       Reviewed       GOALS OF CARE DISCUSSION       Palliative care info/counseling provided	           Family meeting       Advanced Directives addressed	           See previous Palliative Medicine Note  	        REFERRALS	        Palliative Med        Unit SW/Case Mgmt              Speech/Swallow        Hospice             Nutrition         Functional Assessment: KPS:  ,30              PPS:  v poor      FINAL IMPRESSION AND RECOMMENDATIONS:  due adv prostat ca w mets w diffuse pain , patients suffering index is high and aggressive sx management is needed     agree w dnr/dni    will f/u if needed

## 2019-04-25 NOTE — PROGRESS NOTE ADULT - PROBLEM SELECTOR PLAN 3
Low sodium , Dysphagia 3 Soft - Thin Liquids( ground vegetables) +Ensure Clear 8 oz 2x/day (480 shyla, 16 gm pro)
nutrition eval

## 2019-04-25 NOTE — PROGRESS NOTE ADULT - PROBLEM SELECTOR PLAN 5
Oxycontin Q12H  Morphine IVP for breakthrough pain

## 2019-04-25 NOTE — PROGRESS NOTE ADULT - PROBLEM SELECTOR PLAN 2
IVF hydration  Fall Precautions  PT Eval -ELIEL  Social work consult pending for rehab placement as patient unable to care for self. Lives alone.
IVF hydration  Fall Precautions  PT Eval -ELIEL recommended  Social work consult pending for rehab placement as patient unable to care for self. Lives alone.
IVF hydration did not result in significant improvement  -repeat BMP in AM, if still persistent would consider Renal consult, pt reports he urinates well but post-obstructive is a consideration if it does not improve  Fall Precautions  PT Eval -ELIEL  Social work consult pending for rehab placement as patient unable to care for self. Lives alone.
IVF hydration did not result in significant improvement and his Creatinine has been stable  -pt declined Renal consult
IVF hydration  Fall Precautions  PT Eval -ELIEL  Social work consult pending for rehab placement as patient unable to care for self. Lives alone.

## 2019-04-25 NOTE — PROGRESS NOTE ADULT - SUBJECTIVE AND OBJECTIVE BOX
Patient is a 78y old  Male who presents with a chief complaint of Pain and weakness (23 Apr 2019 19:56)      OVERNIGHT EVENTS:  none     MEDICATIONS  (STANDING):  bicalutamide 50 milliGRAM(s) Oral daily  heparin  Injectable 5000 Unit(s) SubCutaneous every 12 hours  metoprolol tartrate 50 milliGRAM(s) Oral two times a day  oxyCODONE  ER Tablet 10 milliGRAM(s) Oral every 12 hours  sodium chloride 0.9%. 1000 milliLiter(s) (30 mL/Hr) IV Continuous <Continuous>    MEDICATIONS  (PRN):  morphine  - Injectable 2 milliGRAM(s) IV Push every 4 hours PRN Severe Pain (7 - 10)  ondansetron Injectable 4 milliGRAM(s) IV Push every 6 hours PRN Nausea and/or Vomiting        Allergies    No Known Allergies    Intolerances    Vital Signs Last 24 Hrs  T(C): 35.9 (25 Apr 2019 04:43), Max: 36.9 (24 Apr 2019 12:37)  T(F): 96.7 (25 Apr 2019 04:43), Max: 98.5 (24 Apr 2019 12:37)  HR: 116 (25 Apr 2019 04:43) (89 - 116)  BP: 133/78 (25 Apr 2019 04:43) (118/70 - 150/84)  BP(mean): --  RR: 18 (25 Apr 2019 04:43) (16 - 18)  SpO2: 99% (25 Apr 2019 04:43) (93% - 99%)    PHYSICAL EXAM:  GENERAL: NAD, well-groomed, well-developed  HEAD:  Atraumatic, Normocephalic  EYES: EOMI, PERRLA, conjunctiva and sclera clear  ENMT: No tonsillar erythema, exudates, or enlargement; Moist mucous membranes, Good dentition, No lesions  NECK: Supple, No JVD, Normal thyroid  NERVOUS SYSTEM:  Alert & Oriented X3, Good concentration; Motor Strength 4/5 B/L upper and lower extremities; DTRs 2+ intact and symmetric  CHEST/LUNG: Clear to percussion bilaterally; No rales, rhonchi, wheezing, or rubs BL  HEART: Regular rate and rhythm; No murmurs, rubs, or gallops  ABDOMEN: Soft, Nontender, Nondistended; Bowel sounds present  EXTREMITIES:  + Peripheral cyanosis BL LE  LYMPH: No lymphadenopathy noted  SKIN: No rashes or lesions    LABS:    pt refusing blood work    Cultures;   CAPILLARY BLOOD GLUCOSE        RADIOLOGY & ADDITIONAL TESTS:    Imaging Personally Reviewed:  [ X] YES  [ ] NO  < from: CT 3D Reconstruct w/ Workstation (04.18.19 @ 16:12) >  Diffuse skeletal metastasis entire skeleton.    Pathologic fractures at the spinous processes of C6 and C7, left third,   eighth and 11th ribs.    < end of copied text >    Consultant(s) Notes Reviewed:  [ X] YES  [ ] NO    Care Discussed with Consultants/Other Providers [X ] YES  [ ] NO Patient is a 78y old  Male who presents with a chief complaint of Pain and weakness (23 Apr 2019 19:56)      OVERNIGHT EVENTS:  none     MEDICATIONS  (STANDING):  bicalutamide 50 milliGRAM(s) Oral daily  heparin  Injectable 5000 Unit(s) SubCutaneous every 12 hours  metoprolol tartrate 50 milliGRAM(s) Oral two times a day  oxyCODONE  ER Tablet 10 milliGRAM(s) Oral every 12 hours  sodium chloride 0.9%. 1000 milliLiter(s) (30 mL/Hr) IV Continuous <Continuous>    MEDICATIONS  (PRN):  morphine  - Injectable 2 milliGRAM(s) IV Push every 4 hours PRN Severe Pain (7 - 10)  ondansetron Injectable 4 milliGRAM(s) IV Push every 6 hours PRN Nausea and/or Vomiting        Allergies    No Known Allergies    Intolerances    Vital Signs Last 24 Hrs  T(C): 35.9 (25 Apr 2019 04:43), Max: 36.9 (24 Apr 2019 12:37)  T(F): 96.7 (25 Apr 2019 04:43), Max: 98.5 (24 Apr 2019 12:37)  HR: 116 (25 Apr 2019 04:43) (89 - 116)  BP: 133/78 (25 Apr 2019 04:43) (118/70 - 150/84)  BP(mean): --  RR: 18 (25 Apr 2019 04:43) (16 - 18)  SpO2: 99% (25 Apr 2019 04:43) (93% - 99%)    PHYSICAL EXAM:  GENERAL: NAD, well-groomed, well-developed  HEAD:  Atraumatic, Normocephalic  EYES: EOMI, PERRLA, conjunctiva and sclera clear  ENMT: No tonsillar erythema, exudates, or enlargement; Moist mucous membranes, Good dentition, No lesions  NECK: Supple, No JVD, Normal thyroid  NERVOUS SYSTEM:  Alert & Oriented X3, Good concentration; Motor Strength 4/5 B/L upper and lower extremities; DTRs 2+ intact and symmetric  CHEST/LUNG: Clear to percussion bilaterally; No rales, rhonchi, wheezing, or rubs BL  HEART: Regular rate and rhythm; No murmurs, rubs, or gallops  ABDOMEN: Soft, Nontender, Nondistended; Bowel sounds present  EXTREMITIES: positive edema 3+  SKIN: No rashes or lesions    LABS:    pt refusing blood work    Cultures;   CAPILLARY BLOOD GLUCOSE        RADIOLOGY & ADDITIONAL TESTS:    Imaging Personally Reviewed:  [ X] YES  [ ] NO  < from: CT 3D Reconstruct w/ Workstation (04.18.19 @ 16:12) >  Diffuse skeletal metastasis entire skeleton.    Pathologic fractures at the spinous processes of C6 and C7, left third,   eighth and 11th ribs.    < end of copied text >    Consultant(s) Notes Reviewed:  [ X] YES  [ ] NO    Care Discussed with Consultants/Other Providers [X ] YES  [ ] NO

## 2019-04-25 NOTE — CHART NOTE - NSCHARTNOTEFT_GEN_A_CORE
Assessment: Pt with functional quadriplegia, NILDA, prostate cancer metastatic to bone, bilateral low back pain without sciatica, chronic A.fib, essential HTN. Pt awaiting rehab placement. Pt is DNR.    Factors impacting intake: [ ] none [ ] nausea  [ ] vomiting [ ] diarrhea [x] persistent constipation  [x]chewing problems (pt with missing teeth, left dentures at home) [ ] swallowing issues  [x] other: reduced appetite; pain; weakness    Diet Prescription: Diet, Dysphagia 3 Soft-Thin Liquids:   Low Sodium  Supplement Feeding Modality:  Oral  Ensure Clear Cans or Servings Per Day:  2       Frequency:  Daily (04-21-19 @ 15:30)    Intake: Po intake 50%; Pt reported he has been drinking and liking the Ensure Clear supplements; Pt assisted with tray set-up    Current Weight: Weight (kg): 86 kg (4/25), 85.8 kg (4/21)  % Weight Change: 0.2% (0.2 kg) wt gain x 4 days during hospitalization; No edema    Pertinent Medications: MEDICATIONS  (STANDING):  bicalutamide 50 milliGRAM(s) Oral daily  heparin  Injectable 5000 Unit(s) SubCutaneous every 12 hours  metoprolol tartrate 50 milliGRAM(s) Oral two times a day  oxyCODONE  ER Tablet 10 milliGRAM(s) Oral every 12 hours  sodium chloride 0.9%. 1000 milliLiter(s) (30 mL/Hr) IV Continuous <Continuous>    MEDICATIONS  (PRN):  morphine  - Injectable 2 milliGRAM(s) IV Push every 4 hours PRN Severe Pain (7 - 10)  ondansetron Injectable 4 milliGRAM(s) IV Push every 6 hours PRN Nausea and/or Vomiting    Pertinent Labs:  04-21 Alb 3.0 g/dL<L>    CAPILLARY BLOOD GLUCOSE    Skin: WDL    Estimated Needs:   [x] no change since previous assessment on 4/21/19  [ ] recalculated:     Previous Nutrition Diagnosis:   [ ] Inadequate Energy Intake [ ]Inadequate Oral Intake [ ] Excessive Energy Intake   [ ] Underweight [ ] Increased Nutrient Needs [ ] Overweight/Obesity   [ ] Altered GI Function [ ] Unintended Weight Loss [ ] Food & Nutrition Related Knowledge Deficit [x] Severe Malnutrition in the context of acute illness     Nutrition Diagnosis is [x] ongoing  [ ] resolved [ ] not applicable    Previous Goal: Pt to consume > 75% of meals & supplement (goal not met)    New Nutrition Diagnosis: [x] not applicable       Interventions:   Recommend  [x] Continue with current nutrition plan of care  [ ] Change Diet To:  [ ] Nutrition Supplement  [ ] Nutrition Support  [x] Other: Cater to food preferences; Recommend MD order stool softener/laxative    Monitoring and Evaluation:   [ x ] PO intake [ x ] Tolerance to diet prescription [ x ] weights [ x ] labs[ x ] follow up per protocol  [ ] other: Assessment: Pt with functional quadriplegia, NILDA, prostate cancer metastatic to bone, bilateral low back pain without sciatica, chronic A.fib, essential HTN. Pt awaiting rehab placement. Pt is DNR.    Factors impacting intake: [ ] none [ ] nausea  [ ] vomiting [ ] diarrhea [x] persistent constipation  [x]chewing problems (pt with missing teeth, left dentures at home) [ ] swallowing issues  [x] other: reduced appetite; pain; weakness    Diet Prescription: Diet, Dysphagia 3 Soft-Thin Liquids:   Low Sodium  Supplement Feeding Modality:  Oral  Ensure Clear Cans or Servings Per Day:  2       Frequency:  Daily (04-21-19 @ 15:30)    Intake: Po intake 50%; Pt reported he has been drinking and liking Ensure Clear supplements; Pt reported he has been receiving and drinking prune juice, but has not had a bowel movement since pta; Pt assisted with tray set-up    Current Weight: Weight (kg): 86 kg (4/25), 85.8 kg (4/21)  % Weight Change: 0.2% (0.2 kg) wt gain x 4 days during hospitalization; No edema    Pertinent Medications: MEDICATIONS  (STANDING):  bicalutamide 50 milliGRAM(s) Oral daily  heparin  Injectable 5000 Unit(s) SubCutaneous every 12 hours  metoprolol tartrate 50 milliGRAM(s) Oral two times a day  oxyCODONE  ER Tablet 10 milliGRAM(s) Oral every 12 hours  sodium chloride 0.9%. 1000 milliLiter(s) (30 mL/Hr) IV Continuous <Continuous>    MEDICATIONS  (PRN):  morphine  - Injectable 2 milliGRAM(s) IV Push every 4 hours PRN Severe Pain (7 - 10)  ondansetron Injectable 4 milliGRAM(s) IV Push every 6 hours PRN Nausea and/or Vomiting    Pertinent Labs:  04-21 Alb 3.0 g/dL<L>    CAPILLARY BLOOD GLUCOSE    Skin: WDL    Estimated Needs:   [x] no change since previous assessment on 4/21/19  [ ] recalculated:     Previous Nutrition Diagnosis:   [ ] Inadequate Energy Intake [ ]Inadequate Oral Intake [ ] Excessive Energy Intake   [ ] Underweight [ ] Increased Nutrient Needs [ ] Overweight/Obesity   [ ] Altered GI Function [ ] Unintended Weight Loss [ ] Food & Nutrition Related Knowledge Deficit [x] Severe Malnutrition in the context of acute illness     Nutrition Diagnosis is [x] ongoing  [ ] resolved [ ] not applicable    Previous Goal: Pt to consume > 75% of meals & supplement (goal not met)    New Nutrition Diagnosis: [x] not applicable       Interventions:   Recommend  [x] Continue with current nutrition plan of care  [ ] Change Diet To:  [ ] Nutrition Supplement  [ ] Nutrition Support  [x] Other: Cater to food preferences; Recommend MD order stool softener/laxative    Monitoring and Evaluation:   [ x ] PO intake [ x ] Tolerance to diet prescription [ x ] weights [ x ] labs[ x ] follow up per protocol  [ ] other:

## 2019-04-25 NOTE — PROGRESS NOTE ADULT - PROBLEM SELECTOR PROBLEM 2
NILDA (acute kidney injury)

## 2019-04-25 NOTE — PROGRESS NOTE ADULT - PROBLEM SELECTOR PLAN 6
Metoprolol Q12H  Patient reported he does not take Warfarin anymore because Dr Jimenez stopped it.
Metoprolol Q12H  Patient reported he does not take Warfarin anymore because Dr Jimenez stopped it.
Admit to telemetry  Start Metoprolol Q12H  Patient reported he does not take Warfarin anymore because Dr Jimenez stopped it.
Metoprolol Q12H  Patient reported he does not take Warfarin anymore because Dr Jimenez stopped it.
Metoprolol Q12H  Patient reported he does not take Warfarin anymore because Dr Jimenez stopped it.

## 2019-04-25 NOTE — PROGRESS NOTE ADULT - PROBLEM SELECTOR PROBLEM 3
Severe protein-calorie malnutrition
Moderate protein-calorie malnutrition
Severe protein-calorie malnutrition

## 2019-04-25 NOTE — PROGRESS NOTE ADULT - PROBLEM SELECTOR PROBLEM 1
Functional quadriplegia

## 2019-04-26 PROCEDURE — 99232 SBSQ HOSP IP/OBS MODERATE 35: CPT

## 2019-04-26 RX ORDER — OXYCODONE HYDROCHLORIDE 5 MG/1
10 TABLET ORAL EVERY 12 HOURS
Qty: 0 | Refills: 0 | Status: DISCONTINUED | OUTPATIENT
Start: 2019-04-26 | End: 2019-05-03

## 2019-04-26 RX ADMIN — BICALUTAMIDE 50 MILLIGRAM(S): 50 TABLET, FILM COATED ORAL at 14:26

## 2019-04-26 RX ADMIN — Medication 50 MILLIGRAM(S): at 05:53

## 2019-04-26 RX ADMIN — Medication 50 MILLIGRAM(S): at 17:23

## 2019-04-26 RX ADMIN — HEPARIN SODIUM 5000 UNIT(S): 5000 INJECTION INTRAVENOUS; SUBCUTANEOUS at 05:53

## 2019-04-26 RX ADMIN — OXYCODONE HYDROCHLORIDE 10 MILLIGRAM(S): 5 TABLET ORAL at 06:19

## 2019-04-26 RX ADMIN — ONDANSETRON 4 MILLIGRAM(S): 8 TABLET, FILM COATED ORAL at 05:53

## 2019-04-26 RX ADMIN — OXYCODONE HYDROCHLORIDE 10 MILLIGRAM(S): 5 TABLET ORAL at 18:05

## 2019-04-26 RX ADMIN — OXYCODONE HYDROCHLORIDE 10 MILLIGRAM(S): 5 TABLET ORAL at 07:01

## 2019-04-26 RX ADMIN — HEPARIN SODIUM 5000 UNIT(S): 5000 INJECTION INTRAVENOUS; SUBCUTANEOUS at 17:23

## 2019-04-26 RX ADMIN — OXYCODONE HYDROCHLORIDE 10 MILLIGRAM(S): 5 TABLET ORAL at 17:22

## 2019-04-26 NOTE — GOALS OF CARE CONVERSATION - PERSONAL ADVANCE DIRECTIVE - CONVERSATION DETAILS
Spoke with MARILYN Cantu. Pt at this time is unable to return home. He lives alone. Disposition is unclear. Pt is appropriate for home hospice services. Once it is decided where pt will reside [facility] will attempt to get hospice consents signed as once they are signed they are only valid for 7 days from date of signature. Alondra states that she will keep me updated of the discharge plan. We will cont to f/u as needed.       Morelia Wiley RN

## 2019-04-26 NOTE — PROGRESS NOTE ADULT - SUBJECTIVE AND OBJECTIVE BOX
Patient is a 78y old  Male who presents with a chief complaint of Pain and weakness (25 Apr 2019 13:07), he denies any pain.    OVERNIGHT EVENTS:  None    MEDICATIONS  (STANDING):  bicalutamide 50 milliGRAM(s) Oral daily  heparin  Injectable 5000 Unit(s) SubCutaneous every 12 hours  metoprolol tartrate 50 milliGRAM(s) Oral two times a day  oxyCODONE  ER Tablet 10 milliGRAM(s) Oral every 12 hours  sodium chloride 0.9%. 1000 milliLiter(s) (30 mL/Hr) IV Continuous <Continuous>    MEDICATIONS  (PRN):  ondansetron Injectable 4 milliGRAM(s) IV Push every 6 hours PRN Nausea and/or Vomiting      REVIEW OF SYSTEMS:  CONSTITUTIONAL: No fever.  EYES: No eye pain,  ENMT:  No difficulty hearing, tinnitus, vertigo; No sinus or throat pain  NECK: No pain or stiffness  RESPIRATORY: No cough,    CARDIOVASCULAR: No chest pain,   GASTROINTESTINAL: No abdominal or epigastric pain   GENITOURINARY: No dysuria,    NEUROLOGICAL: No headaches, memory loss, loss of strength, numbness, or tremors     Vital Signs Last 24 Hrs  T(C): 35.6 (26 Apr 2019 11:10), Max: 36.1 (26 Apr 2019 04:52)  T(F): 96 (26 Apr 2019 11:10), Max: 97 (26 Apr 2019 04:52)  HR: 101 (26 Apr 2019 11:10) (101 - 116)  BP: 111/70 (26 Apr 2019 11:10) (111/70 - 138/84)  BP(mean): --  RR: 18 (26 Apr 2019 11:10) (16 - 18)  SpO2: 97% (26 Apr 2019 11:10) (96% - 100%)    PHYSICAL EXAM:  GENERAL: NAD,    HEAD:  Atraumatic,    EYES: EOMI, PERRLA, conjunctiva and sclera clear  ENMT: No tonsillar erythema,   NECK: Supple, No JVD   NERVOUS SYSTEM:   Decrease; DTRs 2+ intact and symmetric  CHEST/LUNG: Clear to auscultation  bilaterally; No rales, rhonchi, wheezing, or rubs  HEART: Regular rate and rhythm; No murmurs, rubs, or gallops  ABDOMEN: Soft, Nontender, Nondistended; Bowel sounds present  EXTREMITIES:  2+ Peripheral Pulses,   LYMPH: No lymphadenopathy noted  SKIN: No rashes or lesions    LABS:             cardiac markers     CAPILLARY BLOOD GLUCOSE        Cultures    RADIOLOGY & ADDITIONAL TESTS:    Imaging Personally Reviewed:  [ ] YES  [ ] NO    Consultant(s) Notes Reviewed:  [ *] YES  [ ] NO    Care Discussed with Consultants/Other Providers [ * ] YES  [ ] NO

## 2019-04-26 NOTE — PROGRESS NOTE ADULT - ASSESSMENT
Functional quadriplegia.  Plan: IVF hydration  Fall Precautions  PT Eval -ELIEL  Social work: pending for rehab placement as patient unable to care for self. Lives alone.     Patient was lethargic yesterday, SW spoke with his HCP, Ms Whitten who is in agreement with Hospice eval  [facility]    NILDA (acute kidney injury).  Plan: IVF hydration  Fall Precautions  PT Eval -ELIEL recommended    Severe protein-calorie malnutrition.  Plan: Low sodium , Dysphagia 3 Soft - Thin Liquids( ground vegetables) +Ensure Clear 8 oz 2x/day (480 shyla, 16 gm pro).     Prostate cancer metastatic to bone.  Plan: Pain management  Palliative Care Consult, Dr Becky Ferreira Urologist  Dr Deo Form[facility] Oncology  Follow up with Dr Jimenez  [phone].     Bilateral low back pain without sciatica, unspecified chronicity.  Plan: Oxycontin Q12H  Morphine IVP for breakthrough pain.     Chronic atrial fibrillation. Plan: Metoprolol Q12H  Patient reported he does not take Warfarin anymore because Dr Jimenez stopped it.    Essential hypertension.  Plan: Metoprolol Q12H.     Attending Attestation:   await placement .

## 2019-04-27 PROCEDURE — 99232 SBSQ HOSP IP/OBS MODERATE 35: CPT

## 2019-04-27 RX ORDER — TRAMADOL HYDROCHLORIDE 50 MG/1
50 TABLET ORAL EVERY 4 HOURS
Qty: 0 | Refills: 0 | Status: DISCONTINUED | OUTPATIENT
Start: 2019-04-27 | End: 2019-05-04

## 2019-04-27 RX ADMIN — TRAMADOL HYDROCHLORIDE 50 MILLIGRAM(S): 50 TABLET ORAL at 16:09

## 2019-04-27 RX ADMIN — OXYCODONE HYDROCHLORIDE 10 MILLIGRAM(S): 5 TABLET ORAL at 17:57

## 2019-04-27 RX ADMIN — OXYCODONE HYDROCHLORIDE 10 MILLIGRAM(S): 5 TABLET ORAL at 05:42

## 2019-04-27 RX ADMIN — Medication 50 MILLIGRAM(S): at 17:56

## 2019-04-27 RX ADMIN — OXYCODONE HYDROCHLORIDE 10 MILLIGRAM(S): 5 TABLET ORAL at 18:57

## 2019-04-27 RX ADMIN — Medication 50 MILLIGRAM(S): at 05:42

## 2019-04-27 RX ADMIN — OXYCODONE HYDROCHLORIDE 10 MILLIGRAM(S): 5 TABLET ORAL at 06:40

## 2019-04-27 RX ADMIN — TRAMADOL HYDROCHLORIDE 50 MILLIGRAM(S): 50 TABLET ORAL at 17:09

## 2019-04-27 RX ADMIN — HEPARIN SODIUM 5000 UNIT(S): 5000 INJECTION INTRAVENOUS; SUBCUTANEOUS at 05:42

## 2019-04-27 RX ADMIN — BICALUTAMIDE 50 MILLIGRAM(S): 50 TABLET, FILM COATED ORAL at 13:02

## 2019-04-27 RX ADMIN — HEPARIN SODIUM 5000 UNIT(S): 5000 INJECTION INTRAVENOUS; SUBCUTANEOUS at 17:56

## 2019-04-27 NOTE — PROGRESS NOTE ADULT - ASSESSMENT
Functional quadriplegia.   - IVF hydration d/c'ed, able to take PO  - Fall Precautions  - PT Eval -ELIEL  - Social work: pending for rehab placement as patient unable to care for self. Lives alone.   - SW spoke with his HCP, Ms Whitten who is in agreement with Hospice eval.    - [facility]  -Pt was evaluated by Morelia from [facility] who stated that physical therapy was [facility].      NILDA (acute kidney injury).  Plan: IVF hydration  Fall Precautions  PT Eval -ELIEL recommended    Severe protein-calorie malnutrition.  Plan: Low sodium , Dysphagia 3 Soft - Thin Liquids( ground vegetables) +Ensure Clear 8 oz 2x/day (480 shyla, 16 gm pro).     Prostate cancer metastatic to bone.    - Pain management    Bilateral low back pain without sciatica, unspecified chronicity.  Plan: Oxycontin Q12H  Morphine IVP for breakthrough pain.     Chronic atrial fibrillation. Plan: Metoprolol Q12H  Patient reported he does not take Warfarin anymore because Dr Jimenez stopped it.    Essential hypertension.  Plan: Metoprolol Q12H.     Palliative Care Consult, Dr Becky Ferreira Urologist  Dr Deo [facility] On[facility]  Follow up with Dr Jimenez  [phone].     Discharge planning.

## 2019-04-27 NOTE — PROGRESS NOTE ADULT - SUBJECTIVE AND OBJECTIVE BOX
Patient is a 78y old  Male who presents with a chief complaint of Pain and weakness (26 Apr 2019 14:52), he is in NAD    OVERNIGHT EVENTS: none    MEDICATIONS  (STANDING):  bicalutamide 50 milliGRAM(s) Oral daily  heparin  Injectable 5000 Unit(s) SubCutaneous every 12 hours  metoprolol tartrate 50 milliGRAM(s) Oral two times a day  oxyCODONE  ER Tablet 10 milliGRAM(s) Oral every 12 hours  sodium chloride 0.9%. 1000 milliLiter(s) (30 mL/Hr) IV Continuous <Continuous>    MEDICATIONS  (PRN):  ondansetron Injectable 4 milliGRAM(s) IV Push every 6 hours PRN Nausea and/or Vomiting    REVIEW OF SYSTEMS:  CONSTITUTIONAL: No fever, weight loss, or fatigue  EYES: No eye pain, visual disturbances, or discharge  ENMT:  No difficulty hearing, tinnitus, vertigo; No sinus or throat pain  NECK: No pain or stiffness  RESPIRATORY: No cough, wheezing, chills or hemoptysis; No shortness of breath  CARDIOVASCULAR: No chest pain, palpitations, dizziness, or leg swelling  GASTROINTESTINAL: No abdominal or epigastric pain. No nausea, vomiting, or hematemesis; No diarrhea or constipation. No melena or hematochezia.  GENITOURINARY: No dysuria, frequency, hematuria, or incontinence  NEUROLOGICAL: No headaches, memory loss, loss of strength, numbness, or tremors  SKIN: No itching, burning, rashes, or lesions      Vital Signs Last 24 Hrs  T(C): 36 (27 Apr 2019 05:13), Max: 36.1 (26 Apr 2019 23:57)  T(F): 96.8 (27 Apr 2019 05:13), Max: 97 (26 Apr 2019 23:57)  HR: 101 (27 Apr 2019 05:13) (101 - 113)  BP: 132/78 (27 Apr 2019 05:13) (101/63 - 132/78)  BP(mean): --  RR: 17 (27 Apr 2019 05:13) (17 - 17)  SpO2: 98% (27 Apr 2019 05:13) (97% - 98%)    PHYSICAL EXAM:  GENERAL: NAD, well-groomed, well-developed  HEAD:  Atraumatic, Normocephalic  EYES: EOMI, PERRLA, conjunctiva and sclera clear  ENMT: No tonsillar erythema, exudates, or enlargement; Moist mucous membranes   NECK: Supple, No JVD   NERVOUS SYSTEM:  Alert & awake, Good concentration; decreased motor Strength to legs bilaterally.  CHEST/LUNG: Clear to auscultation  bilaterally; No rales, rhonchi, wheezing, or rubs  HEART: Regular rate and rhythm; No murmurs, rubs, or gallops  ABDOMEN: Soft, Nontender, Nondistended; Bowel sounds present  EXTREMITIES:  2+ Peripheral Pulses,   LYMPH: No lymphadenopathy noted  SKIN: No rashes or lesions    LABS:             cardiac markers     CAPILLARY BLOOD GLUCOSE        Cultures    RADIOLOGY & ADDITIONAL TESTS:    Imaging Personally Reviewed:  [ ] YES  [ ] NO    Consultant(s) Notes Reviewed:  [ ] YES  [ ] NO    Care Discussed with Consultants/Other Providers [ ] YES  [ ] NO

## 2019-04-28 PROCEDURE — 99232 SBSQ HOSP IP/OBS MODERATE 35: CPT

## 2019-04-28 RX ORDER — TRAMADOL HYDROCHLORIDE 50 MG/1
1 TABLET ORAL
Qty: 0 | Refills: 0 | COMMUNITY
Start: 2019-04-28

## 2019-04-28 RX ORDER — OXYCODONE HYDROCHLORIDE 5 MG/1
1 TABLET ORAL
Qty: 0 | Refills: 0 | COMMUNITY
Start: 2019-04-28

## 2019-04-28 RX ADMIN — BICALUTAMIDE 50 MILLIGRAM(S): 50 TABLET, FILM COATED ORAL at 12:19

## 2019-04-28 RX ADMIN — OXYCODONE HYDROCHLORIDE 10 MILLIGRAM(S): 5 TABLET ORAL at 18:05

## 2019-04-28 RX ADMIN — Medication 50 MILLIGRAM(S): at 05:06

## 2019-04-28 RX ADMIN — HEPARIN SODIUM 5000 UNIT(S): 5000 INJECTION INTRAVENOUS; SUBCUTANEOUS at 18:06

## 2019-04-28 RX ADMIN — Medication 50 MILLIGRAM(S): at 18:06

## 2019-04-28 RX ADMIN — OXYCODONE HYDROCHLORIDE 10 MILLIGRAM(S): 5 TABLET ORAL at 05:07

## 2019-04-28 RX ADMIN — OXYCODONE HYDROCHLORIDE 10 MILLIGRAM(S): 5 TABLET ORAL at 05:37

## 2019-04-28 RX ADMIN — HEPARIN SODIUM 5000 UNIT(S): 5000 INJECTION INTRAVENOUS; SUBCUTANEOUS at 05:07

## 2019-04-28 RX ADMIN — OXYCODONE HYDROCHLORIDE 10 MILLIGRAM(S): 5 TABLET ORAL at 18:51

## 2019-04-28 NOTE — PROGRESS NOTE ADULT - ASSESSMENT
Functional quadriplegia.   Prostate CA with mets to bone otherwise presenting due to increased back and neck pain and generalized weakness for past 2-3 weeks.   - IVF hydration d/c'ed, able to take PO  - Fall Precautions  - PT Eval -ELIEL.  - Palliative team following  - Pain management  - Social work: pending for rehab placement as patient unable to care for self. Lives alone.   - SW spoke with his HCP, Ms Whitten who is in agreement with Hospice eval.    - [facility]  -Pt was evaluated by Morelia [facility] who stated that physical [facility] was [facility].    NILDA (acute kidney injury).  Plan: IVF hydration  Fall Precautions  PT Eval -ELIEL recommended    Severe protein-calorie malnutrition.    -Low sodium , Dysphagia 3 Soft - Thin Liquids( ground vegetables) +Ensure Clear 8 oz 2x/day (480 shyla, 16 gm pro).     Bilateral low back pain without sciatica, unspecified chronicity.  Plan: Oxycontin Q12H  Morphine IVP for breakthrough pain.     Chronic atrial fibrillation. Plan: Metoprolol Q12H  Patient reported he does not take Warfarin anymore because Dr Jimenez stopped it.    Essential hypertension.  Plan: Metoprolol Q12H.     Palliative Care Consult, Dr Becky Ferreira Urologist  Dr Deo Forman Oncology  Follow up with Dr Jimenez  [phone].     Discharge planning.    DNR/DNI

## 2019-04-28 NOTE — PROGRESS NOTE ADULT - SUBJECTIVE AND OBJECTIVE BOX
Patient is a 78y old  Male who presents with a chief complaint of Pain and weakness (27 Apr 2019 11:26), he has having some neck pain today d/t positioning.       OVERNIGHT EVENTS: none    MEDICATIONS  (STANDING):  bicalutamide 50 milliGRAM(s) Oral daily  heparin  Injectable 5000 Unit(s) SubCutaneous every 12 hours  metoprolol tartrate 50 milliGRAM(s) Oral two times a day  oxyCODONE  ER Tablet 10 milliGRAM(s) Oral every 12 hours    MEDICATIONS  (PRN):  ondansetron Injectable 4 milliGRAM(s) IV Push every 6 hours PRN Nausea and/or Vomiting  traMADol 50 milliGRAM(s) Oral every 4 hours PRN Moderate Pain (4 - 6)        REVIEW OF SYSTEMS:  CONSTITUTIONAL: No fever, weight loss, or fatigue  EYES: No eye pain, visual disturbances, or discharge  ENMT:  No difficulty hearing, tinnitus, vertigo; No sinus or throat pain  NECK:  Neck pain, no stiffness.  RESPIRATORY: No cough, wheezing, chills or hemoptysis; No shortness of breath  CARDIOVASCULAR: No chest pain, palpitations, dizziness, or leg swelling  GASTROINTESTINAL: No abdominal or epigastric pain.   GENITOURINARY: No dysuria, frequency, hematuria, or incontinence  NEUROLOGICAL: No headaches, memory loss, loss of strength, numbness, or tremors  SKIN: No itching, burning, rashes, or lesions      Vital Signs Last 24 Hrs  T(C): 36.2 (28 Apr 2019 04:20), Max: 36.7 (27 Apr 2019 12:02)  T(F): 97.2 (28 Apr 2019 04:20), Max: 98 (27 Apr 2019 12:02)  HR: 111 (28 Apr 2019 05:55) (104 - 125)  BP: 128/80 (28 Apr 2019 05:55) (128/80 - 172/95)  BP(mean): --  RR: 17 (28 Apr 2019 04:20) (17 - 18)  SpO2: 87% (28 Apr 2019 04:20) (87% - 96%)    PHYSICAL EXAM:  GENERAL: NAD, well-groomed, well-developed  HEAD:  Atraumatic, Normocephalic  EYES: EOMI, PERRLA, conjunctiva and sclera clear  ENMT: No tonsillar erythema, exudates, or enlargement; Moist mucous membranes   NECK: Supple, No JVD   NERVOUS SYSTEM:  Alert & Oriented X3, Good concentration;  decreased Motor Strength both lower extremities; DTRs 2+ intact and symmetric  CHEST/LUNG: Clear to auscultation  bilaterally; No rales, rhonchi, wheezing, or rubs  HEART: Regular rate and rhythm; No murmurs, rubs, or gallops  ABDOMEN: Soft, Nontender, Nondistended; Bowel sounds present  EXTREMITIES:  2+ Peripheral Pulses, mild edema both legs.  LYMPH: No lymphadenopathy noted  SKIN: No rashes or lesions    LABS:             cardiac markers     CAPILLARY BLOOD GLUCOSE        Cultures    RADIOLOGY & ADDITIONAL TESTS:    Imaging Personally Reviewed:  [ ] YES  [ ] NO    Consultant(s) Notes Reviewed:  [ ] YES  [ ] NO    Care Discussed with Consultants/Other Providers [ ] YES  [ ] NO

## 2019-04-29 PROCEDURE — 99232 SBSQ HOSP IP/OBS MODERATE 35: CPT

## 2019-04-29 RX ADMIN — TRAMADOL HYDROCHLORIDE 50 MILLIGRAM(S): 50 TABLET ORAL at 09:18

## 2019-04-29 RX ADMIN — TRAMADOL HYDROCHLORIDE 50 MILLIGRAM(S): 50 TABLET ORAL at 00:50

## 2019-04-29 RX ADMIN — OXYCODONE HYDROCHLORIDE 10 MILLIGRAM(S): 5 TABLET ORAL at 18:15

## 2019-04-29 RX ADMIN — OXYCODONE HYDROCHLORIDE 10 MILLIGRAM(S): 5 TABLET ORAL at 05:33

## 2019-04-29 RX ADMIN — OXYCODONE HYDROCHLORIDE 10 MILLIGRAM(S): 5 TABLET ORAL at 06:05

## 2019-04-29 RX ADMIN — TRAMADOL HYDROCHLORIDE 50 MILLIGRAM(S): 50 TABLET ORAL at 00:19

## 2019-04-29 RX ADMIN — Medication 50 MILLIGRAM(S): at 05:33

## 2019-04-29 RX ADMIN — HEPARIN SODIUM 5000 UNIT(S): 5000 INJECTION INTRAVENOUS; SUBCUTANEOUS at 05:33

## 2019-04-29 RX ADMIN — OXYCODONE HYDROCHLORIDE 10 MILLIGRAM(S): 5 TABLET ORAL at 18:14

## 2019-04-29 RX ADMIN — BICALUTAMIDE 50 MILLIGRAM(S): 50 TABLET, FILM COATED ORAL at 11:08

## 2019-04-29 RX ADMIN — TRAMADOL HYDROCHLORIDE 50 MILLIGRAM(S): 50 TABLET ORAL at 10:30

## 2019-04-29 RX ADMIN — HEPARIN SODIUM 5000 UNIT(S): 5000 INJECTION INTRAVENOUS; SUBCUTANEOUS at 18:14

## 2019-04-29 RX ADMIN — Medication 50 MILLIGRAM(S): at 18:14

## 2019-04-29 NOTE — PROGRESS NOTE ADULT - ASSESSMENT
Functional quadriplegia.   Prostate CA with mets to bone otherwise presenting due to increased back and neck pain and generalized weakness for past 2-3 weeks.   - IVF hydration d/c'ed, able to take PO  - Fall Precautions  - PT Eval -ELIEL.  - Palliative team following  - Pain management  - Social work: pending for rehab placement as patient unable to care for self. Lives alone.   - SW spoke with his HCP, Ms Whitten who is in agreement with Hospice eval.    - [facility]  -Pt was evaluated by Morelia from [facility] who stated that physical therapy was [facility].    NILDA (acute kidney injury).  Plan: IVF hydration  Fall Precautions  PT Eval -ELIEL recommended    Severe protein-calorie malnutrition.    -Low sodium , Dysphagia 3 Soft - Thin Liquids( ground vegetables) +Ensure Clear 8 oz 2x/day (480 shyla, 16 gm pro).     Bilateral low back pain without sciatica, unspecified chronicity.  Plan: Oxycontin Q12H  Morphine IVP for breakthrough pain.     Chronic atrial fibrillation. Plan: Metoprolol Q12H  Patient reported he does not take Warfarin anymore because Dr Jimenez stopped it.    Essential hypertension.  Plan: Metoprolol Q12H.     Palliative Care Consult, Dr Becky Ferreira Urologist  Dr Deo Form[facility] Oncology  Follow up with Dr Jimenez  [phone].     Discharge planning.    DNR/DNI

## 2019-04-29 NOTE — CHART NOTE - NSCHARTNOTEFT_GEN_A_CORE
Assessment: Pt seen for chronic severe malnutrition follow-up. Pt admitted with prostate CA metastasis to the bone, lower back pain. Pt with NILDA, HTN , AFIB. RD observed pt sitting up in bed with head down, did not raise head to speak to RD. Per chart review pt remains with poor appetite. Pt denied any N/V/D/C. Pt stated he does not want to eat anymore, RD provided encouragement to increase PO at mealtimes. Pt refused physical exam.     Factors impacting intake: [ ] none [ ] nausea  [ ] vomiting [ ] diarrhea [ ] constipation  [ ]chewing problems [ ] swallowing issues  [ x ] other: decreasing appetite     Diet Prescription: Diet, Dysphagia 3 Soft-Thin Liquids:   Low Sodium  Supplement Feeding Modality:  Oral  Ensure Clear Cans or Servings Per Day:  2       Frequency:  Daily (04-21-19 @ 15:30)    Intake: 40-50 %; <50% of nutrition supplements     Current Weight: 88.3 kg (04/29); 86.2 (04/22)  % Weight Change: 2.4% 4.6# gain x 7 days    Physical appearance: BMI 26.3; no edema noted; Unable to assess physical findings: Pt refused physical assessment stating "please do not touch me".  Per observational viewing- Nutrition focused physical exam conducted:  Subcutaneous fat loss: [ severe ] Orbital fat pads region, [ unable ]Buccal fat region, [ unable ]Triceps region,  [ unable ]Ribs region.  Muscle wasting: [ severe ]Temples region, [ severe ]Clavicle region, [ unable  ]Shoulder region, [ unable ]Scapula region, [ unable  ]Interosseous region,  [ unable ]thigh region, [ unable ]Calf region    Initial Nutrition focused physical exam (04-21) :  [ Moderate  ]  Orbital fat pads region,  [ Unable ]Buccal fat region,  [ Moderate ]triceps region, [ WNL  ]ribs region.  Muscle Exam; [ Moderate  ]temples region, [ Moderate  ]clavicle region, [ Moderate  ]shoulder region, [ Unable  ]Scapula region, [ Moderate  ]Interosseous region, [ Moderate  ]thigh region, [ Moderate  ]Calf region    Pertinent Medications: MEDICATIONS  (STANDING):  bicalutamide 50 milliGRAM(s) Oral daily  heparin  Injectable 5000 Unit(s) SubCutaneous every 12 hours  metoprolol tartrate 50 milliGRAM(s) Oral two times a day  oxyCODONE  ER Tablet 10 milliGRAM(s) Oral every 12 hours    MEDICATIONS  (PRN):  ondansetron Injectable 4 milliGRAM(s) IV Push every 6 hours PRN Nausea and/or Vomiting  traMADol 50 milliGRAM(s) Oral every 4 hours PRN Moderate Pain (4 - 6)    Pertinent Labs:   24Hr FS:  Skin: WDL     Estimated Needs:   [ x ] no change since previous assessment (04-21)  [ ] recalculated:     Previous Nutrition Diagnosis:   [ ] Inadequate Energy Intake [ ]Inadequate Oral Intake [ ] Excessive Energy Intake   [ ] Underweight [ ] Increased Nutrient Needs [ ] Overweight/Obesity   [ ] Altered GI Function [ ] Unintended Weight Loss [ ] Food & Nutrition Related Knowledge Deficit [x ] severe chronic Malnutrition [ ] moderate malnutrition    Nutrition Diagnosis is [ x ] ongoing  [ ] resolved  [ ] improved  [ ] not applicable   Previous Goal: pt to consume >75% of meals & supplement (not met)     New Nutrition Diagnosis: [x ] not applicable       Interventions:   Recommend  [x  ] Continue: Dysphagia 3 Soft, with thin liquids Ensure Clear x 2/day (provides 480 kcal, 16 g protein) Low sodium   [ ] Change Diet To:  [ ] Nutrition Supplement:  [ ] Nutrition Support:  [ x ] Other: please provide encouragement at meal times, at take food preferences frequently to increase PO intake     Monitoring and Evaluation:   [ x ] PO intake [ x ] Tolerance to diet prescription [ x ] weights [ x ] labs[ x ] follow up per protocol  [x  ] other: food preferences

## 2019-04-30 PROCEDURE — 99232 SBSQ HOSP IP/OBS MODERATE 35: CPT

## 2019-04-30 RX ADMIN — OXYCODONE HYDROCHLORIDE 10 MILLIGRAM(S): 5 TABLET ORAL at 17:25

## 2019-04-30 RX ADMIN — HEPARIN SODIUM 5000 UNIT(S): 5000 INJECTION INTRAVENOUS; SUBCUTANEOUS at 17:25

## 2019-04-30 RX ADMIN — BICALUTAMIDE 50 MILLIGRAM(S): 50 TABLET, FILM COATED ORAL at 12:05

## 2019-04-30 RX ADMIN — HEPARIN SODIUM 5000 UNIT(S): 5000 INJECTION INTRAVENOUS; SUBCUTANEOUS at 05:10

## 2019-04-30 RX ADMIN — OXYCODONE HYDROCHLORIDE 10 MILLIGRAM(S): 5 TABLET ORAL at 18:18

## 2019-04-30 RX ADMIN — Medication 50 MILLIGRAM(S): at 17:25

## 2019-04-30 RX ADMIN — OXYCODONE HYDROCHLORIDE 10 MILLIGRAM(S): 5 TABLET ORAL at 05:10

## 2019-04-30 NOTE — PROGRESS NOTE ADULT - ASSESSMENT
Functional quadriplegia.   Prostate CA with mets to bone otherwise presenting due to increased back and neck pain and generalized weakness for past 2-3 weeks.   - IVF hydration d/c'ed, able to take PO  - Fall Precautions  - Pain management  -Pt was evaluated by Morelia [facility] who stated that physical therapy was [facility] appropriate, however insurance will not pay for room/board. SW to follow.     NILDA (acute kidney injury), improved with IVF     Severe protein-calorie malnutrition.    -Low sodium , Dysphagia 3 Soft - Thin Liquids( ground vegetables) +Ensure Clear 8 oz 2x/day (480 shyla, 16 gm pro).     Bilateral low back pain without sciatica, unspecified chronicity, continue with Oxycontin Q12H  Morphine IVP for breakthrough pain.     Chronic atrial fibrillation, continue with Metoprolol Q12H  Patient reported he does not take Warfarin anymore because Dr Jimenez stopped it.    Essential hypertension continue with Metoprolol Q12H.       Dr Aamir Ferreira Urologist  Dr Deo Forman Oncology  Follow up with Dr Jimenez  [phone].     Discharge planning.    DNR/DNI, accepted to nursing home hospice however insurance issue.  will follow with Hospice     Preventative measures,   Fall and aspiration precautions.   Heparin SQ-dvt ppx Functional quadriplegia.   Prostate CA with mets to bone otherwise presenting due to increased back and neck pain and generalized weakness for past 2-3 weeks.   - IVF hydration d/c'ed, able to take PO  - Fall Precautions  - Pain management  -Pt was evaluated by Morelia [facility] who stated that physical therapy was [facility] appropriate, however insurance will not pay for room/board. SW to follow.     CKD3, unclear baseline, stable   Possible NILDA (acute kidney injury) POA     Severe protein-calorie malnutrition.    -Low sodium , Dysphagia 3 Soft - Thin Liquids( ground vegetables) +Ensure Clear 8 oz 2x/day (480 shyla, 16 gm pro).     Bilateral low back pain without sciatica, unspecified chronicity, continue with Oxycontin Q12H  Morphine IVP for breakthrough pain.     Chronic atrial fibrillation, continue with Metoprolol Q12H  Patient reported he does not take Warfarin anymore because Dr Jimenez stopped it.    Essential hypertension continue with Metoprolol Q12H.       Dr Aamir Ferreira Urologist  Dr Deo Forman Oncology  Follow up with Dr Jimenez  [phone].     Discharge planning.    DNR/DNI, accepted to nursing home hospice however insurance issue.  will follow with Hospice     Preventative measures,   Fall and aspiration precautions.   Heparin SQ-dvt ppx

## 2019-04-30 NOTE — PROGRESS NOTE ADULT - SUBJECTIVE AND OBJECTIVE BOX
Patient is a 78y old  Male who presents with a chief complaint of Pain and weakness (27 Apr 2019 11:26), he has having some neck pain today d/t positioning.      Patient seen and examined bedside,   No overnight events.     Denies fever, chills, N/V, dizziness, HA, cough, CP, palpitations, SOB, abdominal pain, dysuria, diarrhea, constipation.     MEDICATIONS  (STANDING):  bicalutamide 50 milliGRAM(s) Oral daily  heparin  Injectable 5000 Unit(s) SubCutaneous every 12 hours  metoprolol tartrate 50 milliGRAM(s) Oral two times a day  oxyCODONE  ER Tablet 10 milliGRAM(s) Oral every 12 hours    MEDICATIONS  (PRN):  ondansetron Injectable 4 milliGRAM(s) IV Push every 6 hours PRN Nausea and/or Vomiting  traMADol 50 milliGRAM(s) Oral every 4 hours PRN Moderate Pain (4 - 6)      Vital Signs Last 24 Hrs  T(C): 35.9 (30 Apr 2019 16:36), Max: 36.2 (29 Apr 2019 23:54)  T(F): 96.7 (30 Apr 2019 16:36), Max: 97.1 (29 Apr 2019 23:54)  HR: 113 (30 Apr 2019 16:36) (100 - 123)  BP: 138/87 (30 Apr 2019 16:36) (111/80 - 138/87)  BP(mean): --  RR: 16 (30 Apr 2019 16:36) (16 - 17)  SpO2: 94% (30 Apr 2019 16:36) (94% - 98%)    PHYSICAL EXAM:  GENERAL: NAD   HEAD:  Atraumatic, Normocephalic  EYES: EOMI, PERRLA, conjunctiva and sclera clear  ENMT: No tonsillar erythema, exudates, or enlargement; Moist mucous membranes   NECK: Supple, No JVD   NERVOUS SYSTEM:  Alert & Oriented X3, moving all extremities   CHEST/LUNG: good b/l air entry, No rales, rhonchi, wheezing, or rubs  HEART: Regular rate and rhythm; No murmurs, rubs, or gallops  ABDOMEN: Soft, Nontender, Nondistended; Bowel sounds present  EXTREMITIES:  2+ Peripheral Pulses b/l , mild edema both legs.      Labs and imaging reviewed.     RADIOLOGY & ADDITIONAL TESTS:    Imaging Personally Reviewed:  [x ] YES  [ ] NO    Consultant(s) Notes Reviewed:  [x ] YES  [ ] NO    Care Discussed with Consultants/Other Providers [x ] YES  [ ] NO

## 2019-05-01 ENCOUNTER — APPOINTMENT (OUTPATIENT)
Dept: HEMATOLOGY ONCOLOGY | Facility: CLINIC | Age: 79
End: 2019-05-01

## 2019-05-01 ENCOUNTER — APPOINTMENT (OUTPATIENT)
Dept: INFUSION THERAPY | Facility: HOSPITAL | Age: 79
End: 2019-05-01

## 2019-05-01 PROCEDURE — 99232 SBSQ HOSP IP/OBS MODERATE 35: CPT

## 2019-05-01 RX ADMIN — OXYCODONE HYDROCHLORIDE 10 MILLIGRAM(S): 5 TABLET ORAL at 17:52

## 2019-05-01 RX ADMIN — Medication 50 MILLIGRAM(S): at 05:12

## 2019-05-01 RX ADMIN — BICALUTAMIDE 50 MILLIGRAM(S): 50 TABLET, FILM COATED ORAL at 17:52

## 2019-05-01 RX ADMIN — HEPARIN SODIUM 5000 UNIT(S): 5000 INJECTION INTRAVENOUS; SUBCUTANEOUS at 05:12

## 2019-05-01 RX ADMIN — OXYCODONE HYDROCHLORIDE 10 MILLIGRAM(S): 5 TABLET ORAL at 05:11

## 2019-05-01 RX ADMIN — Medication 50 MILLIGRAM(S): at 17:53

## 2019-05-01 RX ADMIN — HEPARIN SODIUM 5000 UNIT(S): 5000 INJECTION INTRAVENOUS; SUBCUTANEOUS at 17:53

## 2019-05-01 RX ADMIN — OXYCODONE HYDROCHLORIDE 10 MILLIGRAM(S): 5 TABLET ORAL at 05:40

## 2019-05-01 NOTE — PROGRESS NOTE ADULT - SUBJECTIVE AND OBJECTIVE BOX
Patient is a 78y old  Male who presents with a chief complaint of Pain and weakness (27 Apr 2019 11:26), he has having some neck pain today d/t positioning.      Patient seen and examined bedside,   No overnight events.     Denies fever, chills, N/V, dizziness, HA, cough, CP, palpitations, SOB, abdominal pain, dysuria, diarrhea, constipation.     MEDICATIONS  (STANDING):  bicalutamide 50 milliGRAM(s) Oral daily  heparin  Injectable 5000 Unit(s) SubCutaneous every 12 hours  metoprolol tartrate 50 milliGRAM(s) Oral two times a day  oxyCODONE  ER Tablet 10 milliGRAM(s) Oral every 12 hours    MEDICATIONS  (PRN):  ondansetron Injectable 4 milliGRAM(s) IV Push every 6 hours PRN Nausea and/or Vomiting  traMADol 50 milliGRAM(s) Oral every 4 hours PRN Moderate Pain (4 - 6)    Vitals noted.     PHYSICAL EXAM:  GENERAL: NAD   HEAD:  Atraumatic, Normocephalic  EYES: EOMI, PERRLA, conjunctiva and sclera clear  ENMT: No tonsillar erythema, exudates, or enlargement; Moist mucous membranes   NECK: Supple, No JVD   NERVOUS SYSTEM:  Alert & alert, moving all extremities   CHEST/LUNG: good b/l air entry, No rales, rhonchi, wheezing, or rubs  HEART: Regular rate and rhythm; No murmurs, rubs, or gallops  ABDOMEN: Soft, Nontender, Nondistended; Bowel sounds present  EXTREMITIES:  2+ Peripheral Pulses b/l , mild edema both legs.      Labs and imaging reviewed.     RADIOLOGY & ADDITIONAL TESTS:    Imaging Personally Reviewed:  [x ] YES  [ ] NO    Consultant(s) Notes Reviewed:  [x ] YES  [ ] NO    Care Discussed with Consultants/Other Providers [x ] YES  [ ] NO

## 2019-05-01 NOTE — PROGRESS NOTE ADULT - ASSESSMENT
Functional quadriplegia.   Prostate CA with mets to bone otherwise presenting due to increased back and neck pain and generalized weakness for past 2-3 weeks.   - IVF hydration d/c'ed, able to take PO  - Fall Precautions  - Pain management  -Pt was evaluated by Morelia from [facility] who stated that physical therapy was [facility] and [facility], however insurance will not pay for room/board. SW to follow.     CKD3, unclear baseline, stable   Possible NILDA (acute kidney injury) POA     Severe protein-calorie malnutrition.    -Low sodium , Dysphagia 3 Soft - Thin Liquids( ground vegetables) +Ensure Clear 8 oz 2x/day (480 shyla, 16 gm pro).     Bilateral low back pain without sciatica, unspecified chronicity, continue with Oxycontin Q12H  Morphine IVP for breakthrough pain.     Chronic atrial fibrillation, continue with Metoprolol Q12H  Patient reported he does not take Warfarin anymore because Dr Jimenez stopped it.    Essential hypertension continue with Metoprolol Q12H.       Dr Aamir Ferreira Urologist  Dr [facility]  Follow up with Dr Jimenez  [phone].     Discharge planning.    DNR/DNI, accepted to nursing home hospice however insurance issue.  will follow with Hospice     Preventative measures,   Fall and aspiration precautions.   Heparin SQ-dvt ppx

## 2019-05-02 PROCEDURE — 99232 SBSQ HOSP IP/OBS MODERATE 35: CPT

## 2019-05-02 RX ADMIN — OXYCODONE HYDROCHLORIDE 10 MILLIGRAM(S): 5 TABLET ORAL at 05:40

## 2019-05-02 RX ADMIN — OXYCODONE HYDROCHLORIDE 10 MILLIGRAM(S): 5 TABLET ORAL at 07:52

## 2019-05-02 RX ADMIN — HEPARIN SODIUM 5000 UNIT(S): 5000 INJECTION INTRAVENOUS; SUBCUTANEOUS at 17:01

## 2019-05-02 RX ADMIN — Medication 50 MILLIGRAM(S): at 17:01

## 2019-05-02 RX ADMIN — Medication 50 MILLIGRAM(S): at 05:40

## 2019-05-02 RX ADMIN — OXYCODONE HYDROCHLORIDE 10 MILLIGRAM(S): 5 TABLET ORAL at 17:04

## 2019-05-02 RX ADMIN — HEPARIN SODIUM 5000 UNIT(S): 5000 INJECTION INTRAVENOUS; SUBCUTANEOUS at 05:40

## 2019-05-02 RX ADMIN — BICALUTAMIDE 50 MILLIGRAM(S): 50 TABLET, FILM COATED ORAL at 11:42

## 2019-05-02 NOTE — PROGRESS NOTE ADULT - SUBJECTIVE AND OBJECTIVE BOX
Patient is a 78y old  Male who presents with a chief complaint of Pain and weakness (27 Apr 2019 11:26), he has having some neck pain today d/t positioning.      Patient seen and examined bedside,   No overnight events.     Denies fever, chills, N/V, dizziness, HA, cough, CP, palpitations, SOB, abdominal pain, dysuria, diarrhea, constipation.     MEDICATIONS  (STANDING):  bicalutamide 50 milliGRAM(s) Oral daily  heparin  Injectable 5000 Unit(s) SubCutaneous every 12 hours  metoprolol tartrate 50 milliGRAM(s) Oral two times a day  oxyCODONE  ER Tablet 10 milliGRAM(s) Oral every 12 hours    MEDICATIONS  (PRN):  ondansetron Injectable 4 milliGRAM(s) IV Push every 6 hours PRN Nausea and/or Vomiting  traMADol 50 milliGRAM(s) Oral every 4 hours PRN Moderate Pain (4 - 6)    Vital Signs Last 24 Hrs  T(C): 35.7 (02 May 2019 16:26), Max: 36.3 (02 May 2019 11:25)  T(F): 96.3 (02 May 2019 16:26), Max: 97.4 (02 May 2019 11:25)  HR: 93 (02 May 2019 16:26) (91 - 100)  BP: 116/73 (02 May 2019 16:26) (104/67 - 116/73)  BP(mean): --  RR: 17 (02 May 2019 16:26) (17 - 18)  SpO2: 96% (02 May 2019 16:26) (92% - 96%)    PHYSICAL EXAM:  GENERAL: NAD   HEAD:  Atraumatic, Normocephalic  EYES: EOMI, PERRLA, conjunctiva and sclera clear  ENMT: No tonsillar erythema, exudates, or enlargement; Moist mucous membranes   NECK: Supple, No JVD   NERVOUS SYSTEM:  Alert & alert, moving all extremities   CHEST/LUNG: good b/l air entry, No rales, rhonchi, wheezing, or rubs  HEART: Regular rate and rhythm; No murmurs, rubs, or gallops  ABDOMEN: Soft, Nontender, Nondistended; Bowel sounds present  EXTREMITIES:  2+ Peripheral Pulses b/l , mild edema both legs.      Labs and imaging reviewed.     RADIOLOGY & ADDITIONAL TESTS:    Imaging Personally Reviewed:  [x ] YES  [ ] NO    Consultant(s) Notes Reviewed:  [x ] YES  [ ] NO    Care Discussed with Consultants/Other Providers [x ] YES  [ ] NO

## 2019-05-02 NOTE — PROGRESS NOTE ADULT - ASSESSMENT
Functional quadriplegia.   Prostate CA with mets to bone otherwise presenting due to increased back and neck pain and generalized weakness for past 2-3 weeks.   - IVF hydration d/c'ed, able to take PO  - Fall Precautions  - Pain management  -Pt was evaluated by Morelia from [facility] who stated that physical therapy was [facility] and [facility] appropriate, however insurance will not pay for room/board. SW to follow.     CKD3, unclear baseline, stable   Possible NILDA (acute kidney injury) POA     Severe protein-calorie malnutrition.    -Low sodium , Dysphagia 3 Soft - Thin Liquids( ground vegetables) +Ensure Clear 8 oz 2x/day (480 shyla, 16 gm pro).     Bilateral low back pain without sciatica, unspecified chronicity, continue with Oxycontin Q12H  Morphine IVP for breakthrough pain.     Chronic atrial fibrillation, continue with Metoprolol Q12H  Patient reported he does not take Warfarin anymore because Dr Jimenez stopped it.    Essential hypertension continue with Metoprolol Q12H.       Dr Aamir Ferreira Urologist  Dr [facility]  Follow up with Dr Jimenez  [phone].     Discharge planning.    DNR/DNI, accepted to nursing home hospice however insurance issue.  will follow with Hospice     Preventative measures,   Fall and aspiration precautions.   Heparin SQ-dvt ppx

## 2019-05-03 PROCEDURE — 99232 SBSQ HOSP IP/OBS MODERATE 35: CPT

## 2019-05-03 RX ADMIN — Medication 50 MILLIGRAM(S): at 05:29

## 2019-05-03 RX ADMIN — OXYCODONE HYDROCHLORIDE 10 MILLIGRAM(S): 5 TABLET ORAL at 17:40

## 2019-05-03 RX ADMIN — BICALUTAMIDE 50 MILLIGRAM(S): 50 TABLET, FILM COATED ORAL at 11:15

## 2019-05-03 RX ADMIN — OXYCODONE HYDROCHLORIDE 10 MILLIGRAM(S): 5 TABLET ORAL at 05:28

## 2019-05-03 RX ADMIN — OXYCODONE HYDROCHLORIDE 10 MILLIGRAM(S): 5 TABLET ORAL at 06:28

## 2019-05-03 RX ADMIN — Medication 50 MILLIGRAM(S): at 17:09

## 2019-05-03 RX ADMIN — OXYCODONE HYDROCHLORIDE 10 MILLIGRAM(S): 5 TABLET ORAL at 17:12

## 2019-05-03 RX ADMIN — HEPARIN SODIUM 5000 UNIT(S): 5000 INJECTION INTRAVENOUS; SUBCUTANEOUS at 05:29

## 2019-05-03 RX ADMIN — HEPARIN SODIUM 5000 UNIT(S): 5000 INJECTION INTRAVENOUS; SUBCUTANEOUS at 17:09

## 2019-05-03 NOTE — CHART NOTE - NSCHARTNOTEFT_GEN_A_CORE
Patient has demonstrated no decisional capacity.   Patient is confused, AOx1 (self only), patient's speech is scrambled and nonsensical.   Patient is mostly bedbound, somewhat contraction of b/l LE.     Patient is on pain medications around the clock, he appears comfortable.   Patient unable to ask for prn meds.   HD stable.  In no acute distress. Patient has demonstrated no decisional capacity.   Patient is confused, AOx1 (self only), patient's speech is scrambled and nonsensical.   Patient is mostly bedbound, somewhat contraction of b/l LE.   Patient has limited functionality.   Has functional decline.     Patient is on pain medications around the clock, he appears comfortable.   Patient unable to ask for prn meds.   HD stable.  In no acute distress.

## 2019-05-03 NOTE — PROGRESS NOTE ADULT - ASSESSMENT
Functional quadriplegia.   Prostate CA with mets to bone otherwise presenting due to increased back and neck pain and generalized weakness for past 2-3 weeks.   - IVF hydration d/c'ed, able to take PO  - Fall Precautions  - Pain management  -Pt was evaluated by Morelia from [facility] who stated that physical therapy was [facility] and [facility] [place] [facility] appropriate, however insurance will not pay for room/board. SW to follow.     CKD3, unclear baseline, stable   Possible NILDA (acute kidney injury) POA     Severe protein-calorie malnutrition.    -Low sodium , Dysphagia 3 Soft - Thin Liquids( ground vegetables) +Ensure Clear 8 oz 2x/day (480 shyla, 16 gm pro).     Bilateral low back pain without sciatica, unspecified chronicity, continue with Oxycontin Q12H  Morphine IVP for breakthrough pain.     Chronic atrial fibrillation, continue with Metoprolol Q12H  Patient reported he does not take Warfarin anymore because Dr Jimenez stopped it.    Essential hypertension continue with Metoprolol Q12H.       Dr Aamir Ferreira Urologist  Dr [facility]  Follow up with Dr Jimenez  [phone].     Discharge planning.    DNR/DNI, accepted to nursing home hospice however insurance issue.  will follow with Hospice     Preventative measures,   Fall and aspiration precautions.   Heparin SQ-dvt ppx

## 2019-05-04 PROCEDURE — 99232 SBSQ HOSP IP/OBS MODERATE 35: CPT

## 2019-05-04 RX ADMIN — TRAMADOL HYDROCHLORIDE 50 MILLIGRAM(S): 50 TABLET ORAL at 06:11

## 2019-05-04 RX ADMIN — HEPARIN SODIUM 5000 UNIT(S): 5000 INJECTION INTRAVENOUS; SUBCUTANEOUS at 05:11

## 2019-05-04 RX ADMIN — TRAMADOL HYDROCHLORIDE 50 MILLIGRAM(S): 50 TABLET ORAL at 05:11

## 2019-05-04 RX ADMIN — BICALUTAMIDE 50 MILLIGRAM(S): 50 TABLET, FILM COATED ORAL at 14:07

## 2019-05-04 RX ADMIN — HEPARIN SODIUM 5000 UNIT(S): 5000 INJECTION INTRAVENOUS; SUBCUTANEOUS at 17:23

## 2019-05-04 RX ADMIN — Medication 50 MILLIGRAM(S): at 17:23

## 2019-05-04 RX ADMIN — Medication 50 MILLIGRAM(S): at 05:11

## 2019-05-04 NOTE — PROGRESS NOTE ADULT - ASSESSMENT
Functional quadriplegia.   Prostate CA with mets to bone otherwise presenting due to increased back and neck pain and generalized weakness for past 2-3 weeks.   - IVF hydration d/c'ed, able to take PO  - Fall Precautions  - Pain management  -Pt was evaluated by Morelia from [facility] who stated that physical therapy was [facility] and [facility] appropriate, however insurance will not pay for room/board. SW to follow.     CKD3, unclear baseline, stable   Possible NILDA (acute kidney injury) POA     Severe protein-calorie malnutrition.    -Low sodium , Dysphagia 3 Soft - Thin Liquids( ground vegetables) +Ensure Clear 8 oz 2x/day (480 shyla, 16 gm pro).     Bilateral low back pain without sciatica, unspecified chronicity, continue with Oxycontin Q12H  Morphine IVP for breakthrough pain.     Chronic atrial fibrillation, continue with Metoprolol Q12H  Patient reported he does not take Warfarin anymore because Dr Jimenez stopped it.    Essential hypertension continue with Metoprolol Q12H.       Dr Aamir Ferreira Urologist  Dr [facility]  Follow up with Dr Jimenez  [phone].     Discharge planning.    DNR/DNI, accepted to nursing home hospice however insurance issue.  will follow with Hospice     Preventative measures,   Fall and aspiration precautions.   Heparin SQ-dvt ppx

## 2019-05-04 NOTE — PROGRESS NOTE ADULT - SUBJECTIVE AND OBJECTIVE BOX
Patient is a 78y old  Male who presents with a chief complaint of Pain and weakness (27 Apr 2019 11:26), he has having some neck pain today d/t positioning.      Patient seen and examined bedside,   No overnight events.     Denies fever, chills, N/V, dizziness, HA, cough, CP, palpitations, SOB, abdominal pain, dysuria, diarrhea, constipation.     MEDICATIONS  (STANDING):  bicalutamide 50 milliGRAM(s) Oral daily  heparin  Injectable 5000 Unit(s) SubCutaneous every 12 hours  metoprolol tartrate 50 milliGRAM(s) Oral two times a day  oxyCODONE  ER Tablet 10 milliGRAM(s) Oral every 12 hours    MEDICATIONS  (PRN):  ondansetron Injectable 4 milliGRAM(s) IV Push every 6 hours PRN Nausea and/or Vomiting  traMADol 50 milliGRAM(s) Oral every 4 hours PRN Moderate Pain (4 - 6)    Vital Signs Last 24 Hrs  T(C): 36.5 (04 May 2019 23:52), Max: 36.5 (04 May 2019 23:52)  T(F): 97.7 (04 May 2019 23:52), Max: 97.7 (04 May 2019 23:52)  HR: 87 (04 May 2019 23:52) (87 - 103)  BP: 104/62 (04 May 2019 23:52) (98/72 - 121/78)  BP(mean): --  RR: 18 (04 May 2019 23:52) (16 - 18)  SpO2: 96% (04 May 2019 23:52) (96% - 100%)    PHYSICAL EXAM:  GENERAL: NAD   HEAD:  Atraumatic, Normocephalic  EYES: EOMI, PERRLA, conjunctiva and sclera clear  ENMT: No tonsillar erythema, exudates, or enlargement; Moist mucous membranes   NECK: Supple, No JVD   NERVOUS SYSTEM:  Alert & alert, moving all extremities   CHEST/LUNG: good b/l air entry, No rales, rhonchi, wheezing, or rubs  HEART: Regular rate and rhythm; No murmurs, rubs, or gallops  ABDOMEN: Soft, Nontender, Nondistended; Bowel sounds present  EXTREMITIES:  2+ Peripheral Pulses b/l , mild edema both legs.      Labs and imaging reviewed.     RADIOLOGY & ADDITIONAL TESTS:    Imaging Personally Reviewed:  [x ] YES  [ ] NO    Consultant(s) Notes Reviewed:  [x ] YES  [ ] NO    Care Discussed with Consultants/Other Providers [x ] YES  [ ] NO

## 2019-05-05 PROCEDURE — 99232 SBSQ HOSP IP/OBS MODERATE 35: CPT

## 2019-05-05 RX ADMIN — HEPARIN SODIUM 5000 UNIT(S): 5000 INJECTION INTRAVENOUS; SUBCUTANEOUS at 06:04

## 2019-05-05 RX ADMIN — HEPARIN SODIUM 5000 UNIT(S): 5000 INJECTION INTRAVENOUS; SUBCUTANEOUS at 17:23

## 2019-05-05 RX ADMIN — Medication 50 MILLIGRAM(S): at 06:04

## 2019-05-05 RX ADMIN — Medication 50 MILLIGRAM(S): at 17:23

## 2019-05-05 RX ADMIN — BICALUTAMIDE 50 MILLIGRAM(S): 50 TABLET, FILM COATED ORAL at 11:24

## 2019-05-05 NOTE — PROGRESS NOTE ADULT - SUBJECTIVE AND OBJECTIVE BOX
Patient is a 78y old  Male who presents with a chief complaint of Pain and weakness (27 Apr 2019 11:26), he has having some neck pain today d/t positioning.      Patient seen and examined bedside,   No overnight events.     Denies fever, chills, N/V, dizziness, HA, cough, CP, palpitations, SOB, abdominal pain, dysuria, diarrhea, constipation.     MEDICATIONS  (STANDING):  bicalutamide 50 milliGRAM(s) Oral daily  heparin  Injectable 5000 Unit(s) SubCutaneous every 12 hours  metoprolol tartrate 50 milliGRAM(s) Oral two times a day  oxyCODONE  ER Tablet 10 milliGRAM(s) Oral every 12 hours    MEDICATIONS  (PRN):  ondansetron Injectable 4 milliGRAM(s) IV Push every 6 hours PRN Nausea and/or Vomiting  traMADol 50 milliGRAM(s) Oral every 4 hours PRN Moderate Pain (4 - 6)    Vital Signs Last 24 Hrs  Vitals noted.     PHYSICAL EXAM:  GENERAL: NAD   HEAD:  Atraumatic, Normocephalic  EYES: EOMI, PERRLA, conjunctiva and sclera clear  ENMT: No tonsillar erythema, exudates, or enlargement; Moist mucous membranes   NECK: Supple, No JVD   NERVOUS SYSTEM:  Alert & alert, moving all extremities   CHEST/LUNG: good b/l air entry, No rales, rhonchi, wheezing, or rubs  HEART: Regular rate and rhythm; No murmurs, rubs, or gallops  ABDOMEN: Soft, Nontender, Nondistended; Bowel sounds present  EXTREMITIES:  2+ Peripheral Pulses b/l , mild edema both legs.      Labs and imaging reviewed.     RADIOLOGY & ADDITIONAL TESTS:    Imaging Personally Reviewed:  [x ] YES  [ ] NO    Consultant(s) Notes Reviewed:  [x ] YES  [ ] NO    Care Discussed with Consultants/Other Providers [x ] YES  [ ] NO

## 2019-05-05 NOTE — PROGRESS NOTE ADULT - ASSESSMENT
Functional quadriplegia.   Prostate CA with mets to bone otherwise presenting due to increased back and neck pain and generalized weakness for past 2-3 weeks.   - IVF hydration d/c'ed, able to take PO  - Fall Precautions  - Pain management  -Pt was evaluated by Morelia from [facility] who stated that physical therapy was [facility] appropriate, however insurance will not pay for room/board. SW to follow.     CKD3, unclear baseline, stable   Possible NILDA (acute kidney injury) POA     Severe protein-calorie malnutrition.    -Low sodium , Dysphagia 3 Soft - Thin Liquids( ground vegetables) +Ensure Clear 8 oz 2x/day (480 shyla, 16 gm pro).     Bilateral low back pain without sciatica, unspecified chronicity, continue with Oxycontin Q12H  Morphine IVP for breakthrough pain.     Chronic atrial fibrillation, continue with Metoprolol Q12H  Patient reported he does not take Warfarin anymore because Dr Jimenez stopped it.    Essential hypertension continue with Metoprolol Q12H.       Dr Aamir Ferreira Urologist  Dr [facility]  Follow up with Dr Jimenez  [phone].     Discharge planning.    DNR/DNI, accepted to nursing home hospice however insurance issue.  will follow with Hospice     Preventative measures,   Fall and aspiration precautions.   Heparin SQ-dvt ppx

## 2019-05-06 PROCEDURE — 99233 SBSQ HOSP IP/OBS HIGH 50: CPT

## 2019-05-06 RX ORDER — OXYCODONE AND ACETAMINOPHEN 5; 325 MG/1; MG/1
1 TABLET ORAL EVERY 6 HOURS
Qty: 0 | Refills: 0 | Status: DISCONTINUED | OUTPATIENT
Start: 2019-05-06 | End: 2019-05-06

## 2019-05-06 RX ORDER — OXYCODONE HYDROCHLORIDE 5 MG/1
10 TABLET ORAL EVERY 12 HOURS
Qty: 0 | Refills: 0 | Status: DISCONTINUED | OUTPATIENT
Start: 2019-05-06 | End: 2019-05-07

## 2019-05-06 RX ORDER — MORPHINE SULFATE 50 MG/1
2 CAPSULE, EXTENDED RELEASE ORAL EVERY 4 HOURS
Qty: 0 | Refills: 0 | Status: DISCONTINUED | OUTPATIENT
Start: 2019-05-06 | End: 2019-05-07

## 2019-05-06 RX ORDER — TRAMADOL HYDROCHLORIDE 50 MG/1
50 TABLET ORAL EVERY 6 HOURS
Qty: 0 | Refills: 0 | Status: DISCONTINUED | OUTPATIENT
Start: 2019-05-06 | End: 2019-05-07

## 2019-05-06 RX ADMIN — OXYCODONE HYDROCHLORIDE 10 MILLIGRAM(S): 5 TABLET ORAL at 17:17

## 2019-05-06 RX ADMIN — BICALUTAMIDE 50 MILLIGRAM(S): 50 TABLET, FILM COATED ORAL at 12:36

## 2019-05-06 RX ADMIN — HEPARIN SODIUM 5000 UNIT(S): 5000 INJECTION INTRAVENOUS; SUBCUTANEOUS at 17:17

## 2019-05-06 RX ADMIN — Medication 50 MILLIGRAM(S): at 06:28

## 2019-05-06 RX ADMIN — HEPARIN SODIUM 5000 UNIT(S): 5000 INJECTION INTRAVENOUS; SUBCUTANEOUS at 06:28

## 2019-05-06 RX ADMIN — OXYCODONE HYDROCHLORIDE 10 MILLIGRAM(S): 5 TABLET ORAL at 18:18

## 2019-05-06 RX ADMIN — Medication 50 MILLIGRAM(S): at 17:17

## 2019-05-06 NOTE — PROGRESS NOTE ADULT - ASSESSMENT
Prostate CA with mets to bone with Pathological fractures and chronic back/neck pain   - Functional Quadriplegic now, Fall Precautions  - Pain management  - Poor prognosis, Terminal diagnosis, pt would benefit from Hospice.     Bilateral low back pain without sciatica, unspecified chronicity, continue with Oxycontin Q12H  Morphine IVP for breakthrough pain.     Severe protein-calorie malnutrition.    -Dysphagia 3 Soft - Thin Liquids, w/ Ensure Clear 8 oz 2x/day (480 shyla, 16 gm pro).     CKD3  -stable     Chronic atrial fibrillation,   -continue with Metoprolol Q12H  Patient reported he does not take Warfarin anymore because Dr Jimenez stopped it.    Essential hypertension   -continue with Metoprolol Q12H.     Fall and aspiration precautions.   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 Severe malnutrition/ failure to thrive. Poor prognosis I believe pt is hospice appropriate.     Dr Aamir Ferreira Urologist  Dr [facility]  Follow up with Dr Jimenez  [phone]. Prostate CA with mets to bone with Pathological fractures and chronic back/neck pain   - Functional Quadriplegic now, Fall Precautions  - Pain management  - Poor prognosis, Terminal diagnosis, pt would benefit from Hospice.     Bilateral low back pain without sciatica, unspecified chronicity, continue with Oxycontin Q12H  Morphine IVP for breakthrough pain.     Severe protein-calorie malnutrition.    -Dysphagia 3 Soft - Thin Liquids, w/ Ensure Clear 8 oz 2x/day (480 shyla, 16 gm pro).     CKD3  -stable     Chronic atrial fibrillation,   -continue with Metoprolol Q12H  Patient reported he does not take Warfarin anymore because Dr Jimenez stopped it.    Essential hypertension   -continue with Metoprolol Q12H.     Peripheral Vascular Disease  -poor pedal pulses, cyanosis.   -pt denies any symptoms, would be poor candidate for intervention, will discuss with HCP.     Fall and aspiration precautions.   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 Severe malnutrition/ failure to thrive. Poor prognosis I believe pt is hospice appropriate.     Dr Aamir Ferreira Urologist  Dr [facility]  Follow up with Dr Jimenez  [phone].

## 2019-05-06 NOTE — PROGRESS NOTE ADULT - REASON FOR ADMISSION
Pain and weakness

## 2019-05-06 NOTE — PROGRESS NOTE ADULT - SUBJECTIVE AND OBJECTIVE BOX
Patient is a 78y old  Male who presents with a chief complaint of Pain and weakness (05 May 2019 15:36)      INTERVAL HPI/ OVERNIGHT EVENTS: Pt was seen and examined at bedside today, No significant overnight events, pt denies any acute complaints but this is unreliable.      MEDICATIONS  (STANDING):  heparin  Injectable 5000 Unit(s) SubCutaneous every 12 hours  metoprolol tartrate 50 milliGRAM(s) Oral two times a day    MEDICATIONS  (PRN):  morphine  - Injectable 2 milliGRAM(s) IV Push every 4 hours PRN Severe Pain (7 - 10)  ondansetron Injectable 4 milliGRAM(s) IV Push every 6 hours PRN Nausea and/or Vomiting  traMADol 50 milliGRAM(s) Oral every 6 hours PRN Moderate Pain (4 - 6)      Allergies    No Known Allergies    Intolerances        REVIEW OF SYSTEMS:    Unable to examine due to [ ] Encephalopathy [ ] Advanced Dementia [ ] Expressive Aphasia [ ] Non-verbal patient    CONSTITUTIONAL: No fever, Positive generalized weakness/Fatigue, No weight loss  EYES: No eye pain, visual disturbances, or discharge  ENMT:  No difficulty hearing, tinnitus, vertigo; No sinus or throat pain  NECK: No pain or stiffness  RESPIRATORY: No shortness of breath,  cough, wheezing, sputum or hemoptysis   CARDIOVASCULAR: No chest pain, palpitations, or leg swelling  GASTROINTESTINAL: No abdominal pain. No nausea, vomiting, diarrhea or constipation. No melena or hematochezia.  GENITOURINARY: No dysuria, frequency, hematuria, or incontinence  NEUROLOGICAL: No headaches, Dizziness, memory loss, loss of strength, numbness, or tremors  SKIN: No itching, burning, rashes, or lesions   MUSCULOSKELETAL: No joint pain or swelling; No muscle, back, or extremity pain  PSYCHIATRIC: No depression, anxiety, mood swings, or difficulty sleeping  HEME/LYMPH: No easy bruising, or bleeding gums      Vital Signs Last 24 Hrs  T(C): 36.8 (06 May 2019 11:47), Max: 36.8 (06 May 2019 11:47)  T(F): 98.3 (06 May 2019 11:47), Max: 98.3 (06 May 2019 11:47)  HR: 88 (06 May 2019 11:47) (88 - 112)  BP: 99/53 (06 May 2019 11:47) (99/53 - 117/82)  BP(mean): --  RR: 16 (06 May 2019 11:47) (16 - 18)  SpO2: 97% (06 May 2019 11:47) (97% - 97%)    PHYSICAL EXAM:  GENERAL: NAD, Thin, well-groomed  HEAD:  Atraumatic, Normocephalic  EYES: conjunctiva and sclera clear  ENMT: Moist mucous membranes  NECK: Supple, No JVD, Normal thyroid  CHEST/LUNG: Clear to Auscultation bilaterally; No rales, rhonchi, wheezing, or rubs  HEART: Regular rate and rhythm; No murmurs, rubs, or gallops  ABDOMEN: Soft, Nontender, Nondistended; Bowel sounds present  EXTREMITIES: b/l feet cyanosis, poor b/l pedal Peripheral Pulses, trace b/l LE edema, No clubbing  SKIN: No rashes or lesions  NERVOUS SYSTEM:  Alert & Oriented X2, confused; generalized weakness.     LABS:              CAPILLARY BLOOD GLUCOSE              RADIOLOGY & ADDITIONAL TESTS:          Imaging Personally Reviewed:  [ ] YES  [ ] NO    Consultant(s) Notes Reviewed:  [ x] YES  [ ] NO    Care Discussed with Consultants/Other Providers [x ] YES  [ ] NO

## 2019-05-06 NOTE — PROGRESS NOTE ADULT - PROVIDER SPECIALTY LIST ADULT
Hospitalist
Palliative Care
Hospitalist
no

## 2019-05-07 ENCOUNTER — TRANSCRIPTION ENCOUNTER (OUTPATIENT)
Age: 79
End: 2019-05-07

## 2019-05-07 VITALS
RESPIRATION RATE: 18 BRPM | DIASTOLIC BLOOD PRESSURE: 72 MMHG | OXYGEN SATURATION: 97 % | SYSTOLIC BLOOD PRESSURE: 99 MMHG | TEMPERATURE: 97 F | HEART RATE: 93 BPM

## 2019-05-07 PROCEDURE — 99239 HOSP IP/OBS DSCHRG MGMT >30: CPT

## 2019-05-07 RX ORDER — BICALUTAMIDE 50 MG/1
1 TABLET, FILM COATED ORAL
Qty: 0 | Refills: 0 | COMMUNITY

## 2019-05-07 RX ORDER — MORPHINE SULFATE 50 MG/1
2 CAPSULE, EXTENDED RELEASE ORAL
Qty: 0 | Refills: 0 | COMMUNITY
Start: 2019-05-07

## 2019-05-07 RX ORDER — METOPROLOL TARTRATE 50 MG
1 TABLET ORAL
Qty: 30 | Refills: 0 | OUTPATIENT
Start: 2019-05-07 | End: 2019-06-05

## 2019-05-07 RX ADMIN — OXYCODONE HYDROCHLORIDE 10 MILLIGRAM(S): 5 TABLET ORAL at 06:30

## 2019-05-07 RX ADMIN — HEPARIN SODIUM 5000 UNIT(S): 5000 INJECTION INTRAVENOUS; SUBCUTANEOUS at 05:52

## 2019-05-07 RX ADMIN — Medication 50 MILLIGRAM(S): at 05:52

## 2019-05-07 RX ADMIN — OXYCODONE HYDROCHLORIDE 10 MILLIGRAM(S): 5 TABLET ORAL at 05:53

## 2019-05-07 NOTE — DISCHARGE NOTE NURSING/CASE MANAGEMENT/SOCIAL WORK - NSDCDPATPORTLINK_GEN_ALL_CORE
You can access the [facility], offered by [facility], by registering with the following website: [email or web address]

## 2019-05-14 DIAGNOSIS — E43 UNSPECIFIED SEVERE PROTEIN-CALORIE MALNUTRITION: ICD-10-CM

## 2019-05-14 DIAGNOSIS — M54.9 DORSALGIA, UNSPECIFIED: ICD-10-CM

## 2019-05-14 DIAGNOSIS — M84.58XA PATHOLOGICAL FRACTURE IN NEOPLASTIC DISEASE, OTHER SPECIFIED SITE, INITIAL ENCOUNTER FOR FRACTURE: ICD-10-CM

## 2019-05-14 DIAGNOSIS — R62.7 ADULT FAILURE TO THRIVE: ICD-10-CM

## 2019-05-14 DIAGNOSIS — C79.51 SECONDARY MALIGNANT NEOPLASM OF BONE: ICD-10-CM

## 2019-05-14 DIAGNOSIS — R53.2 FUNCTIONAL QUADRIPLEGIA: ICD-10-CM

## 2019-05-14 DIAGNOSIS — I48.2 CHRONIC ATRIAL FIBRILLATION: ICD-10-CM

## 2019-05-14 DIAGNOSIS — C61 MALIGNANT NEOPLASM OF PROSTATE: ICD-10-CM

## 2019-05-14 DIAGNOSIS — Z51.5 ENCOUNTER FOR PALLIATIVE CARE: ICD-10-CM

## 2019-05-14 DIAGNOSIS — N17.9 ACUTE KIDNEY FAILURE, UNSPECIFIED: ICD-10-CM

## 2019-05-14 DIAGNOSIS — I12.9 HYPERTENSIVE CHRONIC KIDNEY DISEASE WITH STAGE 1 THROUGH STAGE 4 CHRONIC KIDNEY DISEASE, OR UNSPECIFIED CHRONIC KIDNEY DISEASE: ICD-10-CM

## 2019-05-14 DIAGNOSIS — Z66 DO NOT RESUSCITATE: ICD-10-CM

## 2019-05-14 DIAGNOSIS — N18.3 CHRONIC KIDNEY DISEASE, STAGE 3 (MODERATE): ICD-10-CM

## 2019-05-14 DIAGNOSIS — M54.5 LOW BACK PAIN: ICD-10-CM

## 2019-05-14 DIAGNOSIS — R53.1 WEAKNESS: ICD-10-CM

## 2019-05-21 ENCOUNTER — OUTPATIENT (OUTPATIENT)
Dept: OUTPATIENT SERVICES | Facility: HOSPITAL | Age: 79
LOS: 1 days | Discharge: HOME | End: 2019-05-21

## 2019-05-21 DIAGNOSIS — C79.51 SECONDARY MALIGNANT NEOPLASM OF BONE: ICD-10-CM

## 2019-05-21 PROBLEM — I10 ESSENTIAL (PRIMARY) HYPERTENSION: Chronic | Status: ACTIVE | Noted: 2019-04-18

## 2019-05-21 PROBLEM — I48.91 UNSPECIFIED ATRIAL FIBRILLATION: Chronic | Status: ACTIVE | Noted: 2019-04-18

## 2019-05-21 PROBLEM — C61 PROSTATE CANCER METASTATIC TO BONE: Status: ACTIVE | Noted: 2019-04-09

## 2019-05-21 PROBLEM — C61 MALIGNANT NEOPLASM OF PROSTATE: Chronic | Status: ACTIVE | Noted: 2019-04-18

## 2019-05-22 ENCOUNTER — APPOINTMENT (OUTPATIENT)
Dept: HEMATOLOGY ONCOLOGY | Facility: CLINIC | Age: 79
End: 2019-05-22

## 2019-05-22 DIAGNOSIS — C61 MALIGNANT NEOPLASM OF PROSTATE: ICD-10-CM

## 2019-05-22 DIAGNOSIS — C79.51 MALIGNANT NEOPLASM OF PROSTATE: ICD-10-CM

## 2019-11-18 LAB
ALBUMIN SERPL ELPH-MCNC: 4 G/DL
ALP BLD-CCNC: 875 U/L
ALT SERPL-CCNC: 10 U/L
ANION GAP SERPL CALC-SCNC: 16 MMOL/L
AST SERPL-CCNC: 23 U/L
BILIRUB SERPL-MCNC: 0.9 MG/DL
BUN SERPL-MCNC: 76 MG/DL
CALCIUM SERPL-MCNC: 10.3 MG/DL
CHLORIDE SERPL-SCNC: 102 MMOL/L
CO2 SERPL-SCNC: 17 MMOL/L
CREAT SERPL-MCNC: 2 MG/DL
GLUCOSE SERPL-MCNC: 121 MG/DL
LDH SERPL-CCNC: 175 U/L
POTASSIUM SERPL-SCNC: 4.9 MMOL/L
PROT SERPL-MCNC: 7 G/DL
PSA SERPL-MCNC: 61.9 NG/ML
SODIUM SERPL-SCNC: 135 MMOL/L
TESTOST SERPL-MCNC: 357 NG/DL

## 2020-03-06 NOTE — PROGRESS NOTE ADULT - SUBJECTIVE AND OBJECTIVE BOX
Cruz Lang presents today for a reading of his Mantoux Tuberculin Skin Test.    See procedure tab for result.     Signed: Maggy Greenwood CNP     Patient is a 78y old  Male who presents with a chief complaint of Pain and weakness (27 Apr 2019 11:26), he has having some neck pain today d/t positioning.       OVERNIGHT EVENTS: none    MEDICATIONS  (STANDING):  bicalutamide 50 milliGRAM(s) Oral daily  heparin  Injectable 5000 Unit(s) SubCutaneous every 12 hours  metoprolol tartrate 50 milliGRAM(s) Oral two times a day  oxyCODONE  ER Tablet 10 milliGRAM(s) Oral every 12 hours    MEDICATIONS  (PRN):  ondansetron Injectable 4 milliGRAM(s) IV Push every 6 hours PRN Nausea and/or Vomiting  traMADol 50 milliGRAM(s) Oral every 4 hours PRN Moderate Pain (4 - 6)         Vital Signs Last 24 Hrs  T(C): 36.2 (29 Apr 2019 16:27), Max: 36.3 (29 Apr 2019 05:22)  T(F): 97.2 (29 Apr 2019 16:27), Max: 97.4 (29 Apr 2019 05:22)  HR: 106 (29 Apr 2019 16:27) (93 - 117)  BP: 142/95 (29 Apr 2019 16:27) (109/81 - 142/95)  BP(mean): --  RR: 17 (29 Apr 2019 16:27) (17 - 18)  SpO2: 97% (29 Apr 2019 16:27) (95% - 98%)    PHYSICAL EXAM:  GENERAL: NAD, well-groomed, well-developed  HEAD:  Atraumatic, Normocephalic  EYES: EOMI, PERRLA, conjunctiva and sclera clear  ENMT: No tonsillar erythema, exudates, or enlargement; Moist mucous membranes   NECK: Supple, No JVD   NERVOUS SYSTEM:  Alert & Oriented X3, Good concentration;  decreased Motor Strength both lower extremities; DTRs 2+ intact and symmetric  CHEST/LUNG: Clear to auscultation  bilaterally; No rales, rhonchi, wheezing, or rubs  HEART: Regular rate and rhythm; No murmurs, rubs, or gallops  ABDOMEN: Soft, Nontender, Nondistended; Bowel sounds present  EXTREMITIES:  2+ Peripheral Pulses, mild edema both legs.  LYMPH: No lymphadenopathy noted  SKIN: No rashes or lesions    Labs and imaging reviewed.     RADIOLOGY & ADDITIONAL TESTS:    Imaging Personally Reviewed:  [x ] YES  [ ] NO    Consultant(s) Notes Reviewed:  [x ] YES  [ ] NO    Care Discussed with Consultants/Other Providers [x ] YES  [ ] NO

## 2021-07-30 NOTE — H&P ADULT - ATTENDING COMMENTS
Abdomen , soft, nontender, nondistended , no guarding or rigidity , no masses palpable , normal bowel sounds , Liver and Spleen , no hepatomegaly present , no hepatosplenomegaly , liver nontender , spleen not palpable patient seen and examined and agree with above

## 2021-08-11 NOTE — ED PROVIDER NOTE - MDM ORDERS SUBMITTED SELECTION
Go for blood tests as directed. Your doctor will do lab tests at regular visits to monitor the effects of this medicine. Please follow up with your doctor and keep your health care provider appointments.
EKG/Medications/Imaging Studies/Labs